# Patient Record
Sex: MALE | Race: WHITE | NOT HISPANIC OR LATINO
[De-identification: names, ages, dates, MRNs, and addresses within clinical notes are randomized per-mention and may not be internally consistent; named-entity substitution may affect disease eponyms.]

---

## 2018-02-23 PROBLEM — Z00.00 ENCOUNTER FOR PREVENTIVE HEALTH EXAMINATION: Status: ACTIVE | Noted: 2018-02-23

## 2018-02-27 PROBLEM — Z87.19 HISTORY OF GASTROESOPHAGEAL REFLUX (GERD): Status: RESOLVED | Noted: 2018-02-27 | Resolved: 2018-02-27

## 2018-02-27 PROBLEM — Z86.79 HISTORY OF HYPERTENSION: Status: RESOLVED | Noted: 2018-02-27 | Resolved: 2018-02-27

## 2018-02-27 PROBLEM — Z86.39 HISTORY OF HYPERLIPIDEMIA: Status: RESOLVED | Noted: 2018-02-27 | Resolved: 2018-02-27

## 2018-02-28 ENCOUNTER — APPOINTMENT (OUTPATIENT)
Dept: CARDIOTHORACIC SURGERY | Facility: CLINIC | Age: 63
End: 2018-02-28
Payer: COMMERCIAL

## 2018-02-28 ENCOUNTER — NON-APPOINTMENT (OUTPATIENT)
Age: 63
End: 2018-02-28

## 2018-02-28 ENCOUNTER — OUTPATIENT (OUTPATIENT)
Dept: OUTPATIENT SERVICES | Facility: HOSPITAL | Age: 63
LOS: 1 days | End: 2018-02-28
Payer: COMMERCIAL

## 2018-02-28 VITALS
DIASTOLIC BLOOD PRESSURE: 71 MMHG | SYSTOLIC BLOOD PRESSURE: 152 MMHG | RESPIRATION RATE: 16 BRPM | TEMPERATURE: 97.2 F | HEART RATE: 64 BPM | BODY MASS INDEX: 31.28 KG/M2 | HEIGHT: 73 IN | OXYGEN SATURATION: 97 % | WEIGHT: 236 LBS

## 2018-02-28 DIAGNOSIS — Q23.1 CONGENITAL INSUFFICIENCY OF AORTIC VALVE: ICD-10-CM

## 2018-02-28 DIAGNOSIS — I35.0 NONRHEUMATIC AORTIC (VALVE) STENOSIS: ICD-10-CM

## 2018-02-28 DIAGNOSIS — I71.2 THORACIC AORTIC ANEURYSM, W/OUT RUPTURE: ICD-10-CM

## 2018-02-28 DIAGNOSIS — Z82.49 FAMILY HISTORY OF ISCHEMIC HEART DISEASE AND OTHER DISEASES OF THE CIRCULATORY SYSTEM: ICD-10-CM

## 2018-02-28 DIAGNOSIS — Z87.39 PERSONAL HISTORY OF OTHER DISEASES OF THE MUSCULOSKELETAL SYSTEM AND CONNECTIVE TISSUE: ICD-10-CM

## 2018-02-28 DIAGNOSIS — Z86.79 PERSONAL HISTORY OF OTHER DISEASES OF THE CIRCULATORY SYSTEM: ICD-10-CM

## 2018-02-28 DIAGNOSIS — G47.33 OBSTRUCTIVE SLEEP APNEA (ADULT) (PEDIATRIC): ICD-10-CM

## 2018-02-28 DIAGNOSIS — F17.290 NICOTINE DEPENDENCE, OTHER TOBACCO PRODUCT, UNCOMPLICATED: ICD-10-CM

## 2018-02-28 DIAGNOSIS — Z80.9 FAMILY HISTORY OF MALIGNANT NEOPLASM, UNSPECIFIED: ICD-10-CM

## 2018-02-28 DIAGNOSIS — Z87.01 PERSONAL HISTORY OF PNEUMONIA (RECURRENT): ICD-10-CM

## 2018-02-28 DIAGNOSIS — Z87.19 PERSONAL HISTORY OF OTHER DISEASES OF THE DIGESTIVE SYSTEM: ICD-10-CM

## 2018-02-28 DIAGNOSIS — Z86.39 PERSONAL HISTORY OF OTHER ENDOCRINE, NUTRITIONAL AND METABOLIC DISEASE: ICD-10-CM

## 2018-02-28 LAB
ALBUMIN SERPL ELPH-MCNC: 4.2 G/DL — SIGNIFICANT CHANGE UP (ref 3.3–5)
ALP SERPL-CCNC: 56 U/L — SIGNIFICANT CHANGE UP (ref 40–120)
ALT FLD-CCNC: 21 U/L — SIGNIFICANT CHANGE UP (ref 10–45)
ANION GAP SERPL CALC-SCNC: 13 MMOL/L — SIGNIFICANT CHANGE UP (ref 5–17)
APPEARANCE UR: CLEAR — SIGNIFICANT CHANGE UP
APTT BLD: 45.1 SEC — HIGH (ref 27.5–37.4)
AST SERPL-CCNC: 25 U/L — SIGNIFICANT CHANGE UP (ref 10–40)
BASOPHILS NFR BLD AUTO: 0.5 % — SIGNIFICANT CHANGE UP (ref 0–2)
BILIRUB SERPL-MCNC: 0.4 MG/DL — SIGNIFICANT CHANGE UP (ref 0.2–1.2)
BILIRUB UR-MCNC: NEGATIVE — SIGNIFICANT CHANGE UP
BUN SERPL-MCNC: 15 MG/DL — SIGNIFICANT CHANGE UP (ref 7–23)
CALCIUM SERPL-MCNC: 9.4 MG/DL — SIGNIFICANT CHANGE UP (ref 8.4–10.5)
CHLORIDE SERPL-SCNC: 103 MMOL/L — SIGNIFICANT CHANGE UP (ref 96–108)
CHOLEST SERPL-MCNC: 199 MG/DL — SIGNIFICANT CHANGE UP (ref 10–199)
CO2 SERPL-SCNC: 24 MMOL/L — SIGNIFICANT CHANGE UP (ref 22–31)
COLOR SPEC: YELLOW — SIGNIFICANT CHANGE UP
CREAT SERPL-MCNC: 1.1 MG/DL — SIGNIFICANT CHANGE UP (ref 0.5–1.3)
DIFF PNL FLD: NEGATIVE — SIGNIFICANT CHANGE UP
EOSINOPHIL NFR BLD AUTO: 4.1 % — SIGNIFICANT CHANGE UP (ref 0–6)
GLUCOSE SERPL-MCNC: 83 MG/DL — SIGNIFICANT CHANGE UP (ref 70–99)
GLUCOSE UR QL: NEGATIVE — SIGNIFICANT CHANGE UP
HBA1C BLD-MCNC: 5.4 % — SIGNIFICANT CHANGE UP (ref 4–5.6)
HBV SURFACE AG SER-ACNC: SIGNIFICANT CHANGE UP
HCT VFR BLD CALC: 47.3 % — SIGNIFICANT CHANGE UP (ref 39–50)
HDLC SERPL-MCNC: 69 MG/DL — SIGNIFICANT CHANGE UP (ref 40–125)
HGB BLD-MCNC: 15.1 G/DL — SIGNIFICANT CHANGE UP (ref 13–17)
INR BLD: 1 — SIGNIFICANT CHANGE UP (ref 0.88–1.16)
KETONES UR-MCNC: NEGATIVE — SIGNIFICANT CHANGE UP
LEUKOCYTE ESTERASE UR-ACNC: NEGATIVE — SIGNIFICANT CHANGE UP
LIPID PNL WITH DIRECT LDL SERPL: 102 MG/DL — SIGNIFICANT CHANGE UP
LYMPHOCYTES # BLD AUTO: 22.7 % — SIGNIFICANT CHANGE UP (ref 13–44)
MCHC RBC-ENTMCNC: 27.8 PG — SIGNIFICANT CHANGE UP (ref 27–34)
MCHC RBC-ENTMCNC: 31.9 G/DL — LOW (ref 32–36)
MCV RBC AUTO: 86.9 FL — SIGNIFICANT CHANGE UP (ref 80–100)
MONOCYTES NFR BLD AUTO: 8.8 % — SIGNIFICANT CHANGE UP (ref 2–14)
NEUTROPHILS NFR BLD AUTO: 63.9 % — SIGNIFICANT CHANGE UP (ref 43–77)
NITRITE UR-MCNC: NEGATIVE — SIGNIFICANT CHANGE UP
PH UR: 6 — SIGNIFICANT CHANGE UP (ref 5–8)
PLATELET # BLD AUTO: 132 K/UL — LOW (ref 150–400)
POTASSIUM SERPL-MCNC: 4.2 MMOL/L — SIGNIFICANT CHANGE UP (ref 3.5–5.3)
POTASSIUM SERPL-SCNC: 4.2 MMOL/L — SIGNIFICANT CHANGE UP (ref 3.5–5.3)
PROT SERPL-MCNC: 7.4 G/DL — SIGNIFICANT CHANGE UP (ref 6–8.3)
PROT UR-MCNC: NEGATIVE MG/DL — SIGNIFICANT CHANGE UP
PROTHROM AB SERPL-ACNC: 11.1 SEC — SIGNIFICANT CHANGE UP (ref 9.8–12.7)
RBC # BLD: 5.44 M/UL — SIGNIFICANT CHANGE UP (ref 4.2–5.8)
RBC # FLD: 13.3 % — SIGNIFICANT CHANGE UP (ref 10.3–16.9)
SODIUM SERPL-SCNC: 140 MMOL/L — SIGNIFICANT CHANGE UP (ref 135–145)
SP GR SPEC: 1.01 — SIGNIFICANT CHANGE UP (ref 1–1.03)
TOTAL CHOLESTEROL/HDL RATIO MEASUREMENT: 2.9 RATIO — LOW (ref 3.4–9.6)
TRIGL SERPL-MCNC: 141 MG/DL — SIGNIFICANT CHANGE UP (ref 10–149)
TSH SERPL-MCNC: 1.41 UIU/ML — SIGNIFICANT CHANGE UP (ref 0.35–4.94)
UROBILINOGEN FLD QL: 0.2 E.U./DL — SIGNIFICANT CHANGE UP
WBC # BLD: 6.6 K/UL — SIGNIFICANT CHANGE UP (ref 3.8–10.5)
WBC # FLD AUTO: 6.6 K/UL — SIGNIFICANT CHANGE UP (ref 3.8–10.5)

## 2018-02-28 PROCEDURE — 86850 RBC ANTIBODY SCREEN: CPT

## 2018-02-28 PROCEDURE — 83036 HEMOGLOBIN GLYCOSYLATED A1C: CPT

## 2018-02-28 PROCEDURE — 85730 THROMBOPLASTIN TIME PARTIAL: CPT

## 2018-02-28 PROCEDURE — 93306 TTE W/DOPPLER COMPLETE: CPT

## 2018-02-28 PROCEDURE — 81003 URINALYSIS AUTO W/O SCOPE: CPT

## 2018-02-28 PROCEDURE — 86900 BLOOD TYPING SEROLOGIC ABO: CPT

## 2018-02-28 PROCEDURE — 36415 COLL VENOUS BLD VENIPUNCTURE: CPT

## 2018-02-28 PROCEDURE — 93306 TTE W/DOPPLER COMPLETE: CPT | Mod: 26

## 2018-02-28 PROCEDURE — 80053 COMPREHEN METABOLIC PANEL: CPT

## 2018-02-28 PROCEDURE — 87340 HEPATITIS B SURFACE AG IA: CPT

## 2018-02-28 PROCEDURE — 86901 BLOOD TYPING SEROLOGIC RH(D): CPT

## 2018-02-28 PROCEDURE — 80061 LIPID PANEL: CPT

## 2018-02-28 PROCEDURE — 85025 COMPLETE CBC W/AUTO DIFF WBC: CPT

## 2018-02-28 PROCEDURE — 85610 PROTHROMBIN TIME: CPT

## 2018-02-28 PROCEDURE — 99205 OFFICE O/P NEW HI 60 MIN: CPT

## 2018-02-28 PROCEDURE — 84443 ASSAY THYROID STIM HORMONE: CPT

## 2018-03-01 PROBLEM — Q23.1 BICUSPID AORTIC VALVE: Status: ACTIVE | Noted: 2018-02-27

## 2018-03-01 PROBLEM — G47.33 OSA (OBSTRUCTIVE SLEEP APNEA): Status: ACTIVE | Noted: 2018-03-01

## 2018-03-01 PROBLEM — Z82.49 FAMILY HISTORY OF CORONARY ARTERY DISEASE: Status: ACTIVE | Noted: 2018-03-01

## 2018-03-01 PROBLEM — Z86.79 HISTORY OF CARDIAC MURMUR: Status: RESOLVED | Noted: 2018-03-01 | Resolved: 2018-03-01

## 2018-03-01 PROBLEM — F17.290 CIGAR SMOKER: Status: ACTIVE | Noted: 2018-03-01

## 2018-03-01 PROBLEM — Z82.49 FAMILY HISTORY OF HYPERTENSION: Status: ACTIVE | Noted: 2018-03-01

## 2018-03-01 PROBLEM — I35.0 AORTIC STENOSIS: Status: ACTIVE | Noted: 2018-02-27

## 2018-03-01 PROBLEM — Z87.39 HISTORY OF CHRONIC BACK PAIN: Status: RESOLVED | Noted: 2018-03-01 | Resolved: 2018-03-01

## 2018-03-01 PROBLEM — I71.2 THORACIC AORTIC ANEURYSM WITHOUT RUPTURE: Status: ACTIVE | Noted: 2018-02-27

## 2018-03-01 PROBLEM — Z80.9 FAMILY HISTORY OF MALIGNANT NEOPLASM: Status: ACTIVE | Noted: 2018-03-01

## 2018-03-01 PROBLEM — Z87.01 HISTORY OF PNEUMONIA: Status: RESOLVED | Noted: 2018-03-01 | Resolved: 2018-03-01

## 2018-03-01 RX ORDER — ZOLPIDEM TARTRATE 10 MG/1
10 TABLET ORAL
Qty: 30 | Refills: 0 | Status: ACTIVE | COMMUNITY
Start: 2017-10-04

## 2018-03-01 RX ORDER — METOPROLOL TARTRATE 50 MG/1
50 TABLET, FILM COATED ORAL
Qty: 30 | Refills: 0 | Status: DISCONTINUED | COMMUNITY
Start: 2017-11-01

## 2018-03-01 RX ORDER — PREDNISONE 20 MG/1
20 TABLET ORAL
Qty: 11 | Refills: 0 | Status: DISCONTINUED | COMMUNITY
Start: 2017-10-18

## 2018-03-01 RX ORDER — HYDROCODONE POLISTIREX AND CHLORPHENIRAMINE POLISTIREX 10; 8 MG/5ML; MG/5ML
10-8 SUSPENSION, EXTENDED RELEASE ORAL
Qty: 210 | Refills: 0 | Status: DISCONTINUED | COMMUNITY
Start: 2017-10-18

## 2018-03-01 RX ORDER — METHYLPREDNISOLONE 4 MG/1
4 TABLET ORAL
Qty: 21 | Refills: 0 | Status: DISCONTINUED | COMMUNITY
Start: 2017-09-26

## 2018-03-01 RX ORDER — CELECOXIB 200 MG/1
200 CAPSULE ORAL
Qty: 90 | Refills: 0 | Status: DISCONTINUED | COMMUNITY
Start: 2017-11-18

## 2018-03-01 RX ORDER — SIMVASTATIN 20 MG/1
20 TABLET, FILM COATED ORAL
Qty: 30 | Refills: 0 | Status: ACTIVE | COMMUNITY
Start: 2018-03-01

## 2018-03-01 RX ORDER — SIMVASTATIN 20 MG/1
20 TABLET, FILM COATED ORAL
Qty: 30 | Refills: 0 | Status: DISCONTINUED | COMMUNITY
Start: 2017-10-18

## 2018-03-01 RX ORDER — ALBUTEROL SULFATE 90 UG/1
108 (90 BASE) AEROSOL, METERED RESPIRATORY (INHALATION)
Qty: 8 | Refills: 0 | Status: DISCONTINUED | COMMUNITY
Start: 2017-09-26

## 2018-03-01 RX ORDER — CEFPODOXIME PROXETIL 200 MG/1
200 TABLET, FILM COATED ORAL
Qty: 20 | Refills: 0 | Status: DISCONTINUED | COMMUNITY
Start: 2017-09-26

## 2018-03-01 RX ORDER — MONTELUKAST 10 MG/1
10 TABLET, FILM COATED ORAL
Refills: 0 | Status: ACTIVE | COMMUNITY
Start: 2018-03-01

## 2018-03-01 RX ORDER — LEVOCETIRIZINE DIHYDROCHLORIDE 5 MG/1
5 TABLET ORAL
Qty: 90 | Refills: 0 | Status: ACTIVE | COMMUNITY
Start: 2017-11-01

## 2018-03-01 RX ORDER — PANTOPRAZOLE 40 MG/1
40 TABLET, DELAYED RELEASE ORAL
Qty: 30 | Refills: 0 | Status: ACTIVE | COMMUNITY
Start: 2017-11-21

## 2018-03-01 RX ORDER — OLMESARTAN MEDOXOMIL 40 MG/1
40 TABLET, FILM COATED ORAL
Qty: 30 | Refills: 0 | Status: DISCONTINUED | COMMUNITY
Start: 2018-02-25

## 2018-03-01 RX ORDER — MONTELUKAST 10 MG/1
10 TABLET, FILM COATED ORAL
Qty: 30 | Refills: 0 | Status: DISCONTINUED | COMMUNITY
Start: 2017-11-13

## 2018-03-01 RX ORDER — L-METHYLFOLATE-ALGAE-VIT B12-B6 CAP 3-90.314-2-35 MG 3-90.314-2-35 MG
3-90.314-2-35 CAP ORAL
Qty: 90 | Refills: 0 | Status: ACTIVE | COMMUNITY
Start: 2017-11-24

## 2018-03-05 DIAGNOSIS — I35.9 NONRHEUMATIC AORTIC VALVE DISORDER, UNSPECIFIED: ICD-10-CM

## 2018-03-06 NOTE — H&P ADULT - ASSESSMENT
62 year old male with a past medical history of hypertension, hyperlipidemia, GERD, sleep apnea (not on CPAP), chronic back pain, presents for evaluation and management of bicuspid aortic valve, aortic stenosis and ascending aortic aneurysm.    Echocardiogram 11/7/17 revealed: LVEF 40-45%. Calcified bicuspid aortic valve with reduced opening. The mean gradient is 30mmHg, moderate aortic stenosis. Peak velocity is 3.2m/s. Mild aortic regurgitation.     Cardiac catheterization 2/21/18 revealed: no coronary artery disease. VILLA 0.6cm2. Mean gradient 36mmHg.     Repeat echocardiogram today revealed: left ventricular ejection fraction is estimated to be 65-70%. Calcified aortic valve. The number of aortic valve cusps cannot be discerned. No aortic regurgitation noted. There is Severe aortic stenosis. The mean pressure gradient is 45 mmHg. No aortic root dilatation.  There is no pericardial effusion. The calculated aortic valve area using the continuity equation is 0.7 cm2.    CTA chest 3/2/18 revealed: aortic root 4.3cm.     The patient c/o fatigue and decrease in stamina. The patient states that it is more difficult for him to walk up 2 flights of stairs compared to last year because he is feeling winded and has mild dyspnea on exertion. He also states he has periodic episodes of dizziness with positional changes and "chest fluttering". Patient denies fever, chills, fatigue, headache, blurred vision, syncope, chest pain, palpitations, shortness of breath at rest orthopnea, paroxysmal nocturnal dyspnea, nausea, vomiting, abdominal pain, back pain, BRBPR or swelling to legs.    Plan:   I have reviewed the indications for surgery, and used our webpage [email or web address] <[email or web address]> to illustrate the aorta and anatomy of the heart. I discussed the indications for surgery with the patient. Given his symptoms of decreased stamina, shortness of breath (NYHA Class II), severe aortic stenosis with a mean gradient of 45mmHg, he meets those indications.     I have recommended that the patient is a candidate for an minimally invasive Aortic Valve Replacement.    I have described 2 valve options. I have described the mechanical valve prosthesis which does require Coumadin therapy with a daily pill as well as frequent lab tests. The mechanical valve has excellent longevity and is usually only removed in the cases of infective bacterial prosthetic valve endocarditis or pannus formation. Approximately over 90% of mechanical valves never need to be removed. However, there is a risk with Coumadin, a blood thinner, approximately a 1% thromboembolic risk per year.  The other valve option is a biological valve. In general, approximately 50% of these need to be removed at approximately 15 years. However, these valves do not require Coumadin therapy and, therefore, do not have the associated risks of the blood thinner.    The patient has chosen a bioprosthetic valve.    Of note, I discussed that with the current use of Transcatheter Aortic Valve Replacement (TAVR), there is a possibility that future replacement of a failing bioprosthetic valve by TAVR may be an option.     I have explained the risks of the procedure, including approximately 1-2% major mortality or morbidity including stroke, infection, bleeding, death, renal failure and heart attack. The patient understands the risks and wishes to proceed. I have answered all questions fully.

## 2018-03-06 NOTE — H&P ADULT - NSHPREVIEWOFSYSTEMS_GEN_ALL_CORE
CONSTITUTIONAL:  fatigue DENIES Fevers / chills, sweats, weight loss, weight gain                                      NEURO:  parathesias, seizures, syncope, confusion                                                                               NEGATIVE  EYES:  Blurry vision, discharge, pain, loss of vision                                                                                  NEGATIVE  ENMT:  Difficulty hearing, vertigo, dysphagia, epistaxis, recent dental work                                     NEGATIVE  CV:  Chest pain, palpitations, HUDSON, orthopnea                                                                                         NEGATIVE  RESPIRATORY:  Wheezing, SOB, cough / sputum, hemoptysis                                                              NEGATIVE  GI:  Nausea, vommiting, diarrhea, constipation, melena                                                                        NEGATIVE  : Hematuria, dysuria, urgency, incontinence                                                                                       NEGATIVE  MUSKULOSKELETAL:  arthritis, joint swelling, muscle weakness                                                           NEGATIVE  SKIN/BREAST:  rash, itching, norm loss, masses                                                                                            NEGATIVE  PSYCH:  depresion, anxiety, suicidal ideation                                                                                             NEGATIVE  HEME/LYMPH:  bruises easily, enlarged lymph nodes, tender lymph nodes                                        NEGATIVE  ENDOCRINE:  cold intolerance, heat intolerance, polydipsia                                                                   NEGATIVE

## 2018-03-06 NOTE — H&P ADULT - FAMILY HISTORY
Mother  Still living? Unknown  Hypertension, Age at diagnosis: Age Unknown  Family history of early CAD, Age at diagnosis: Age Unknown     Father  Still living? Unknown  Family history of cancer, Age at diagnosis: Age Unknown

## 2018-03-06 NOTE — H&P ADULT - HISTORY OF PRESENT ILLNESS
62 year old male with a past medical history of hypertension, hyperlipidemia, GERD, sleep apnea (not on CPAP), chronic back pain, presents for evaluation and management of bicuspid aortic valve, aortic stenosis and ascending aortic aneurysm.    Echocardiogram 11/7/17 revealed: LVEF 40-45%. Calcified bicuspid aortic valve with reduced opening. The mean gradient is 30mmHg, moderate aortic stenosis. Peak velocity is 3.2m/s. Mild aortic regurgitation.     Cardiac catheterization 2/21/18 revealed: no coronary artery disease. VILLA 0.6cm2. Mean gradient 36mmHg.     Repeat echocardiogram today revealed: left ventricular ejection fraction is estimated to be 65-70%. Calcified aortic valve. The number of aortic valve cusps cannot be discerned. No aortic regurgitation noted. There is Severe aortic stenosis. The mean pressure gradient is 45 mmHg. No aortic root dilatation.  There is no pericardial effusion. The calculated aortic valve area using the continuity equation is 0.7 cm2.    CTA chest 3/2/18 revealed: aortic root 4.3cm.     The patient c/o fatigue and decrease in stamina. The patient states that it is more difficult for him to walk up 2 flights of stairs compared to last year because he is feeling winded and has mild dyspnea on exertion. He also states he has periodic episodes of dizziness with positional changes and "chest fluttering". Patient denies fever, chills, fatigue, headache, blurred vision, syncope, chest pain, palpitations, shortness of breath at rest orthopnea, paroxysmal nocturnal dyspnea, nausea, vomiting, abdominal pain, back pain, BRBPR or swelling to legs.    The patient was consulted by CT surgery and is deemed a candidate for a minimally invasive aortic valve replacement.

## 2018-03-06 NOTE — H&P ADULT - NSHPLABSRESULTS_GEN_ALL_CORE
Echocardiogram 2/28/18    Interpretation Summary  Normal left ventricular size and wall thickness.  The left ventricular wall motion is normal.  The left ventricular ejection fraction is estimated to be 65-70%  The left atrial size is normal.  Right atrial size is normal.  The right ventricle is normal in size and function.  Calcified aortic valve.  The number of aortic valve cusps cannot be discerned.  No aortic regurgitation noted.  There is Severe aortic stenosis.  The mean pressure gradient is 45 mmHg.  The calculated aortic valve area using the continuity equation is 0.7 cm2.  There is mild mitral annular calcification.  No mitral regurgitation noted.  Other valves normal in structure and function.  The pulmonary artery systolic pressure is estimated to be 29 mmHg.  No aortic root dilatation.  The inferior vena cava is normal in size (<2.1 cm) with normal inspiratory collapse (>50%) consistent with normal right atrial pressure.  There is no pericardial effusion.    Echocardiogram 11/7/17 revealed: LVEF 40-45%. Calcified bicuspid aortic valve with reduced opening. The mean gradient is 30mmHg, moderate aortic stenosis. Peak velocity is 3.2m/s. Mild aortic regurgitation.     Cardiac catheterization 2/21/18 revealed: no coronary artery disease. VILLA 0.6cm2. Mean gradient 36mmHg.    CTA chest 3/2/18 aortic root 4.3cm    Carotid doppler 3/2/18 there is no evidence of hemodynamically significant internal carotid artery stenosis. there is less than 50 percent stenosis seen bilaterally according to SRU criteria. There is mild bilateral atherosclerosis of the carotid bulbs.     Chest xray 3/2/18 no acute pulmonary disease.                                   15.1  	6.6   )-----------( 432     	            47.3  	    	140  |  103  |  15  	----------------------------<  83  	4.2   |  24  |  1.10                TSH 1.412                             Taurus 9.4    	TPro  7.4  /  Alb  4.2  /  TBili  0.4  /   AST  25  /  ALT  21  /  AlkPhos 562                 Hemoglobin A1C 5.4

## 2018-03-06 NOTE — H&P ADULT - PMH
Bicuspid aortic valve    Chronic back pain    GERD (gastroesophageal reflux disease)    High cholesterol    Hypertension    Severe aortic stenosis    Sleep apnea

## 2018-03-06 NOTE — H&P ADULT - NSHPPHYSICALEXAM_GEN_ALL_CORE
Vital Signs   	Recorded: [id number] 11:30AM  Systolic	152  Diastolic	71  Height	6 ft 1 in  Weight	236 lb   BMI Calculated	31.14  BSA Calculated	2.31  Temperature	97.2 F  Heart Rate	64  Respiration	16  O2 Saturation	97    Physical Exam  Constitutional: alert and in no acute distress.   Eyes: the sclera and conjunctiva were normal, pupils were equal in size, round, and reactive to light and extraocular movements were intact.   ENT: the ears and nose were normal in appearance . the oropharynx was normal.   Neck: the appearance of the neck was normal, no neck mass was observed, the thyroid was not enlarged and there were no palpable thyroid nodules . there was no jugular-venous distention.   Pulmonary: no respiratory distress and lungs were clear to auscultation bilaterally.   Cardiovascular: The heart rate was normal. The rhythm was regular. Heart sounds: normal S1, normal S2.   A grade 3 systolic murmur was heard at the RUSB.   Chest: the chest was normal in appearance, no chest asymmetry and normal chest expansion.   Vascular: Carotid: right 2+, left 2+, no bruit heard over the right carotid and no bruit heard over the left carotid. Radial: right 2+ and left 2+.   Abdominal Aorta: the abdominal aorta was not enlarged and no bruit was heard.   Breasts:. deferred.   Abdomen: normal bowel sounds, soft, non-tender, no hepato-splenomegaly and no abdominal mass palpated.   Genitourinary:. deferred.   Lymphatics: The posterior cervical and anterior cervical nodes were non-tender and normal size.   Back: no costovertebral angle tenderness and no spinal tenderness.   Musculoskeletal: normal gait, no clubbing or cyanosis of the fingernails, no joint swelling seen and muscle strength and tone were normal.   Skin: normal skin color and pigmentation, normal skin turgor and no rash.   Neurological: deep tendon reflexes were 2+ and symmetric, the sensory exam was normal to light touch and pinprick and no focal deficits.   Psychiatric: oriented to person, place, and time, insight and judgment were intact and the affect was normal.

## 2018-03-09 RX ORDER — TRIAMCINOLONE ACETONIDE 55 MCG
0 AEROSOL, SPRAY (GRAM) NASAL
Qty: 0 | Refills: 0 | COMMUNITY

## 2018-03-09 NOTE — PATIENT PROFILE ADULT. - PMH
Bicuspid aortic valve    Chronic back pain    GERD (gastroesophageal reflux disease)    High cholesterol    Hypertension    Severe aortic stenosis    Sleep apnea Bicuspid aortic valve    Chronic back pain    GERD (gastroesophageal reflux disease)    High cholesterol    Hypertension    Postnasal drip    Severe aortic stenosis    Sleep apnea

## 2018-03-12 ENCOUNTER — RESULT REVIEW (OUTPATIENT)
Age: 63
End: 2018-03-12

## 2018-03-12 ENCOUNTER — INPATIENT (INPATIENT)
Facility: HOSPITAL | Age: 63
LOS: 5 days | Discharge: HOME | DRG: 220 | End: 2018-03-18
Attending: THORACIC SURGERY (CARDIOTHORACIC VASCULAR SURGERY) | Admitting: THORACIC SURGERY (CARDIOTHORACIC VASCULAR SURGERY)
Payer: COMMERCIAL

## 2018-03-12 ENCOUNTER — APPOINTMENT (OUTPATIENT)
Dept: CARDIOTHORACIC SURGERY | Facility: HOSPITAL | Age: 63
End: 2018-03-12
Payer: COMMERCIAL

## 2018-03-12 VITALS
HEART RATE: 61 BPM | RESPIRATION RATE: 18 BRPM | TEMPERATURE: 98 F | WEIGHT: 235.01 LBS | DIASTOLIC BLOOD PRESSURE: 88 MMHG | SYSTOLIC BLOOD PRESSURE: 177 MMHG | OXYGEN SATURATION: 97 %

## 2018-03-12 DIAGNOSIS — Z98.890 OTHER SPECIFIED POSTPROCEDURAL STATES: Chronic | ICD-10-CM

## 2018-03-12 DIAGNOSIS — I35.0 NONRHEUMATIC AORTIC (VALVE) STENOSIS: ICD-10-CM

## 2018-03-12 PROBLEM — I10 ESSENTIAL (PRIMARY) HYPERTENSION: Chronic | Status: ACTIVE | Noted: 2018-03-06

## 2018-03-12 PROBLEM — Q23.1 CONGENITAL INSUFFICIENCY OF AORTIC VALVE: Chronic | Status: ACTIVE | Noted: 2018-03-06

## 2018-03-12 PROBLEM — E78.00 PURE HYPERCHOLESTEROLEMIA, UNSPECIFIED: Chronic | Status: ACTIVE | Noted: 2018-03-06

## 2018-03-12 PROBLEM — R09.82 POSTNASAL DRIP: Chronic | Status: ACTIVE | Noted: 2018-03-09

## 2018-03-12 PROBLEM — K21.9 GASTRO-ESOPHAGEAL REFLUX DISEASE WITHOUT ESOPHAGITIS: Chronic | Status: ACTIVE | Noted: 2018-03-06

## 2018-03-12 PROBLEM — G47.30 SLEEP APNEA, UNSPECIFIED: Chronic | Status: ACTIVE | Noted: 2018-03-06

## 2018-03-12 PROBLEM — M54.9 DORSALGIA, UNSPECIFIED: Chronic | Status: ACTIVE | Noted: 2018-03-06

## 2018-03-12 LAB
ALBUMIN SERPL ELPH-MCNC: 3.5 G/DL — SIGNIFICANT CHANGE UP (ref 3.3–5)
ALP SERPL-CCNC: 31 U/L — LOW (ref 40–120)
ALT FLD-CCNC: 22 U/L — SIGNIFICANT CHANGE UP (ref 10–45)
ANION GAP SERPL CALC-SCNC: 10 MMOL/L — SIGNIFICANT CHANGE UP (ref 5–17)
ANION GAP SERPL CALC-SCNC: 12 MMOL/L — SIGNIFICANT CHANGE UP (ref 5–17)
ANION GAP SERPL CALC-SCNC: 13 MMOL/L — SIGNIFICANT CHANGE UP (ref 5–17)
APTT BLD: 25.4 SEC — LOW (ref 27.5–37.4)
APTT BLD: 40.6 SEC — HIGH (ref 27.5–37.4)
APTT BLD: 45.6 SEC — HIGH (ref 27.5–37.4)
APTT BLD: 53.2 SEC — HIGH (ref 27.5–37.4)
AST SERPL-CCNC: 50 U/L — HIGH (ref 10–40)
BASE EXCESS BLDA CALC-SCNC: -1.4 MMOL/L — SIGNIFICANT CHANGE UP (ref -2–3)
BASE EXCESS BLDA CALC-SCNC: -4.6 MMOL/L — LOW (ref -2–3)
BILIRUB SERPL-MCNC: 0.9 MG/DL — SIGNIFICANT CHANGE UP (ref 0.2–1.2)
BLD GP AB SCN SERPL QL: NEGATIVE — SIGNIFICANT CHANGE UP
BUN SERPL-MCNC: 15 MG/DL — SIGNIFICANT CHANGE UP (ref 7–23)
BUN SERPL-MCNC: 15 MG/DL — SIGNIFICANT CHANGE UP (ref 7–23)
BUN SERPL-MCNC: 18 MG/DL — SIGNIFICANT CHANGE UP (ref 7–23)
CALCIUM SERPL-MCNC: 7.4 MG/DL — LOW (ref 8.4–10.5)
CALCIUM SERPL-MCNC: 7.4 MG/DL — LOW (ref 8.4–10.5)
CALCIUM SERPL-MCNC: 7.7 MG/DL — LOW (ref 8.4–10.5)
CHLORIDE SERPL-SCNC: 104 MMOL/L — SIGNIFICANT CHANGE UP (ref 96–108)
CHLORIDE SERPL-SCNC: 105 MMOL/L — SIGNIFICANT CHANGE UP (ref 96–108)
CHLORIDE SERPL-SCNC: 105 MMOL/L — SIGNIFICANT CHANGE UP (ref 96–108)
CO2 SERPL-SCNC: 23 MMOL/L — SIGNIFICANT CHANGE UP (ref 22–31)
CO2 SERPL-SCNC: 23 MMOL/L — SIGNIFICANT CHANGE UP (ref 22–31)
CO2 SERPL-SCNC: 24 MMOL/L — SIGNIFICANT CHANGE UP (ref 22–31)
CREAT SERPL-MCNC: 0.97 MG/DL — SIGNIFICANT CHANGE UP (ref 0.5–1.3)
CREAT SERPL-MCNC: 1 MG/DL — SIGNIFICANT CHANGE UP (ref 0.5–1.3)
CREAT SERPL-MCNC: 1.04 MG/DL — SIGNIFICANT CHANGE UP (ref 0.5–1.3)
GAS PNL BLDA: SIGNIFICANT CHANGE UP
GLUCOSE BLDC GLUCOMTR-MCNC: 129 MG/DL — HIGH (ref 70–99)
GLUCOSE BLDC GLUCOMTR-MCNC: 132 MG/DL — HIGH (ref 70–99)
GLUCOSE BLDC GLUCOMTR-MCNC: 140 MG/DL — HIGH (ref 70–99)
GLUCOSE SERPL-MCNC: 136 MG/DL — HIGH (ref 70–99)
GLUCOSE SERPL-MCNC: 148 MG/DL — HIGH (ref 70–99)
GLUCOSE SERPL-MCNC: 149 MG/DL — HIGH (ref 70–99)
HCO3 BLDA-SCNC: 21 MMOL/L — SIGNIFICANT CHANGE UP (ref 21–28)
HCO3 BLDA-SCNC: 24 MMOL/L — SIGNIFICANT CHANGE UP (ref 21–28)
HCT VFR BLD CALC: 30.8 % — LOW (ref 39–50)
HCT VFR BLD CALC: 32 % — LOW (ref 39–50)
HCT VFR BLD CALC: 33.1 % — LOW (ref 39–50)
HCT VFR BLD CALC: 37.5 % — LOW (ref 39–50)
HGB BLD-MCNC: 10.3 G/DL — LOW (ref 13–17)
HGB BLD-MCNC: 10.3 G/DL — LOW (ref 13–17)
HGB BLD-MCNC: 11.9 G/DL — LOW (ref 13–17)
HGB BLD-MCNC: 9.7 G/DL — LOW (ref 13–17)
INR BLD: 1.21 — HIGH (ref 0.88–1.16)
INR BLD: 1.22 — HIGH (ref 0.88–1.16)
INR BLD: 1.23 — HIGH (ref 0.88–1.16)
INR BLD: 1.24 — HIGH (ref 0.88–1.16)
LACTATE SERPL-SCNC: 1.2 MMOL/L — SIGNIFICANT CHANGE UP (ref 0.5–2)
LYMPHOCYTES # BLD AUTO: 6 % — LOW (ref 13–44)
MAGNESIUM SERPL-MCNC: 2.5 MG/DL — SIGNIFICANT CHANGE UP (ref 1.6–2.6)
MAGNESIUM SERPL-MCNC: 2.8 MG/DL — HIGH (ref 1.6–2.6)
MAGNESIUM SERPL-MCNC: 3.1 MG/DL — HIGH (ref 1.6–2.6)
MCHC RBC-ENTMCNC: 27.2 PG — SIGNIFICANT CHANGE UP (ref 27–34)
MCHC RBC-ENTMCNC: 27.6 PG — SIGNIFICANT CHANGE UP (ref 27–34)
MCHC RBC-ENTMCNC: 28 PG — SIGNIFICANT CHANGE UP (ref 27–34)
MCHC RBC-ENTMCNC: 28.2 PG — SIGNIFICANT CHANGE UP (ref 27–34)
MCHC RBC-ENTMCNC: 31.1 G/DL — LOW (ref 32–36)
MCHC RBC-ENTMCNC: 31.5 G/DL — LOW (ref 32–36)
MCHC RBC-ENTMCNC: 31.7 G/DL — LOW (ref 32–36)
MCHC RBC-ENTMCNC: 32.2 G/DL — SIGNIFICANT CHANGE UP (ref 32–36)
MCV RBC AUTO: 87.6 FL — SIGNIFICANT CHANGE UP (ref 80–100)
MCV RBC AUTO: 87.7 FL — SIGNIFICANT CHANGE UP (ref 80–100)
MCV RBC AUTO: 87.7 FL — SIGNIFICANT CHANGE UP (ref 80–100)
MCV RBC AUTO: 88.2 FL — SIGNIFICANT CHANGE UP (ref 80–100)
MONOCYTES NFR BLD AUTO: 6 % — SIGNIFICANT CHANGE UP (ref 2–14)
NEUTROPHILS NFR BLD AUTO: 82 % — HIGH (ref 43–77)
PCO2 BLDA: 43 MMHG — SIGNIFICANT CHANGE UP (ref 35–48)
PCO2 BLDA: 44 MMHG — SIGNIFICANT CHANGE UP (ref 35–48)
PH BLDA: 7.32 — LOW (ref 7.35–7.45)
PH BLDA: 7.36 — SIGNIFICANT CHANGE UP (ref 7.35–7.45)
PLATELET # BLD AUTO: 127 K/UL — LOW (ref 150–400)
PLATELET # BLD AUTO: 48 K/UL — LOW (ref 150–400)
PLATELET # BLD AUTO: 70 K/UL — LOW (ref 150–400)
PLATELET # BLD AUTO: 74 K/UL — LOW (ref 150–400)
PO2 BLDA: 119 MMHG — HIGH (ref 83–108)
PO2 BLDA: 95 MMHG — SIGNIFICANT CHANGE UP (ref 83–108)
POTASSIUM SERPL-MCNC: 4.4 MMOL/L — SIGNIFICANT CHANGE UP (ref 3.5–5.3)
POTASSIUM SERPL-MCNC: 4.8 MMOL/L — SIGNIFICANT CHANGE UP (ref 3.5–5.3)
POTASSIUM SERPL-MCNC: 5.4 MMOL/L — HIGH (ref 3.5–5.3)
POTASSIUM SERPL-SCNC: 4.4 MMOL/L — SIGNIFICANT CHANGE UP (ref 3.5–5.3)
POTASSIUM SERPL-SCNC: 4.8 MMOL/L — SIGNIFICANT CHANGE UP (ref 3.5–5.3)
POTASSIUM SERPL-SCNC: 5.4 MMOL/L — HIGH (ref 3.5–5.3)
PROT SERPL-MCNC: 5.2 G/DL — LOW (ref 6–8.3)
PROTHROM AB SERPL-ACNC: 13.5 SEC — HIGH (ref 9.8–12.7)
PROTHROM AB SERPL-ACNC: 13.6 SEC — HIGH (ref 9.8–12.7)
PROTHROM AB SERPL-ACNC: 13.7 SEC — HIGH (ref 9.8–12.7)
PROTHROM AB SERPL-ACNC: 13.8 SEC — HIGH (ref 9.8–12.7)
RBC # BLD: 3.51 M/UL — LOW (ref 4.2–5.8)
RBC # BLD: 3.65 M/UL — LOW (ref 4.2–5.8)
RBC # BLD: 3.78 M/UL — LOW (ref 4.2–5.8)
RBC # BLD: 4.25 M/UL — SIGNIFICANT CHANGE UP (ref 4.2–5.8)
RBC # FLD: 13.4 % — SIGNIFICANT CHANGE UP (ref 10.3–16.9)
RBC # FLD: 13.4 % — SIGNIFICANT CHANGE UP (ref 10.3–16.9)
RBC # FLD: 13.5 % — SIGNIFICANT CHANGE UP (ref 10.3–16.9)
RBC # FLD: 13.5 % — SIGNIFICANT CHANGE UP (ref 10.3–16.9)
RH IG SCN BLD-IMP: POSITIVE — SIGNIFICANT CHANGE UP
SAO2 % BLDA: 97 % — SIGNIFICANT CHANGE UP (ref 95–100)
SAO2 % BLDA: 98 % — SIGNIFICANT CHANGE UP (ref 95–100)
SODIUM SERPL-SCNC: 138 MMOL/L — SIGNIFICANT CHANGE UP (ref 135–145)
SODIUM SERPL-SCNC: 140 MMOL/L — SIGNIFICANT CHANGE UP (ref 135–145)
SODIUM SERPL-SCNC: 141 MMOL/L — SIGNIFICANT CHANGE UP (ref 135–145)
WBC # BLD: 11.7 K/UL — HIGH (ref 3.8–10.5)
WBC # BLD: 12.3 K/UL — HIGH (ref 3.8–10.5)
WBC # BLD: 25 K/UL — HIGH (ref 3.8–10.5)
WBC # BLD: 8.9 K/UL — SIGNIFICANT CHANGE UP (ref 3.8–10.5)
WBC # FLD AUTO: 11.7 K/UL — HIGH (ref 3.8–10.5)
WBC # FLD AUTO: 12.3 K/UL — HIGH (ref 3.8–10.5)
WBC # FLD AUTO: 25 K/UL — HIGH (ref 3.8–10.5)
WBC # FLD AUTO: 8.9 K/UL — SIGNIFICANT CHANGE UP (ref 3.8–10.5)

## 2018-03-12 PROCEDURE — 33405 REPLACEMENT AORTIC VALVE OPN: CPT

## 2018-03-12 PROCEDURE — 93010 ELECTROCARDIOGRAM REPORT: CPT

## 2018-03-12 PROCEDURE — 33208 INSRT HEART PM ATRIAL & VENT: CPT | Mod: KX

## 2018-03-12 PROCEDURE — 33405 REPLACEMENT AORTIC VALVE OPN: CPT | Mod: 80

## 2018-03-12 PROCEDURE — 71045 X-RAY EXAM CHEST 1 VIEW: CPT | Mod: 26

## 2018-03-12 RX ORDER — DEXTROSE 50 % IN WATER 50 %
25 SYRINGE (ML) INTRAVENOUS
Qty: 0 | Refills: 0 | Status: DISCONTINUED | OUTPATIENT
Start: 2018-03-12 | End: 2018-03-13

## 2018-03-12 RX ORDER — ALBUMIN HUMAN 25 %
250 VIAL (ML) INTRAVENOUS
Qty: 0 | Refills: 0 | Status: COMPLETED | OUTPATIENT
Start: 2018-03-12 | End: 2018-03-12

## 2018-03-12 RX ORDER — FAMOTIDINE 10 MG/ML
20 INJECTION INTRAVENOUS DAILY
Qty: 0 | Refills: 0 | Status: DISCONTINUED | OUTPATIENT
Start: 2018-03-12 | End: 2018-03-13

## 2018-03-12 RX ORDER — DEXTROSE 50 % IN WATER 50 %
50 SYRINGE (ML) INTRAVENOUS
Qty: 0 | Refills: 0 | Status: DISCONTINUED | OUTPATIENT
Start: 2018-03-12 | End: 2018-03-13

## 2018-03-12 RX ORDER — DEXMEDETOMIDINE HYDROCHLORIDE IN 0.9% SODIUM CHLORIDE 4 UG/ML
0.7 INJECTION INTRAVENOUS
Qty: 200 | Refills: 0 | Status: DISCONTINUED | OUTPATIENT
Start: 2018-03-12 | End: 2018-03-13

## 2018-03-12 RX ORDER — FENTANYL CITRATE 50 UG/ML
50 INJECTION INTRAVENOUS ONCE
Qty: 0 | Refills: 0 | Status: DISCONTINUED | OUTPATIENT
Start: 2018-03-12 | End: 2018-03-12

## 2018-03-12 RX ORDER — SIMVASTATIN 20 MG/1
20 TABLET, FILM COATED ORAL AT BEDTIME
Qty: 0 | Refills: 0 | Status: DISCONTINUED | OUTPATIENT
Start: 2018-03-12 | End: 2018-03-18

## 2018-03-12 RX ORDER — PROPOFOL 10 MG/ML
5 INJECTION, EMULSION INTRAVENOUS
Qty: 1000 | Refills: 0 | Status: DISCONTINUED | OUTPATIENT
Start: 2018-03-12 | End: 2018-03-13

## 2018-03-12 RX ORDER — ASPIRIN/CALCIUM CARB/MAGNESIUM 324 MG
81 TABLET ORAL DAILY
Qty: 0 | Refills: 0 | Status: DISCONTINUED | OUTPATIENT
Start: 2018-03-13 | End: 2018-03-18

## 2018-03-12 RX ORDER — SODIUM CHLORIDE 9 MG/ML
1000 INJECTION, SOLUTION INTRAVENOUS
Qty: 0 | Refills: 0 | Status: DISCONTINUED | OUTPATIENT
Start: 2018-03-12 | End: 2018-03-18

## 2018-03-12 RX ORDER — MEPERIDINE HYDROCHLORIDE 50 MG/ML
25 INJECTION INTRAMUSCULAR; INTRAVENOUS; SUBCUTANEOUS ONCE
Qty: 0 | Refills: 0 | Status: DISCONTINUED | OUTPATIENT
Start: 2018-03-12 | End: 2018-03-13

## 2018-03-12 RX ORDER — HEPARIN SODIUM 5000 [USP'U]/ML
5000 INJECTION INTRAVENOUS; SUBCUTANEOUS EVERY 8 HOURS
Qty: 0 | Refills: 0 | Status: DISCONTINUED | OUTPATIENT
Start: 2018-03-13 | End: 2018-03-18

## 2018-03-12 RX ORDER — CEFAZOLIN SODIUM 1 G
2000 VIAL (EA) INJECTION EVERY 8 HOURS
Qty: 0 | Refills: 0 | Status: DISCONTINUED | OUTPATIENT
Start: 2018-03-12 | End: 2018-03-13

## 2018-03-12 RX ORDER — INSULIN HUMAN 100 [IU]/ML
1 INJECTION, SOLUTION SUBCUTANEOUS
Qty: 100 | Refills: 0 | Status: DISCONTINUED | OUTPATIENT
Start: 2018-03-12 | End: 2018-03-13

## 2018-03-12 RX ORDER — ACETAMINOPHEN 500 MG
1000 TABLET ORAL ONCE
Qty: 0 | Refills: 0 | Status: COMPLETED | OUTPATIENT
Start: 2018-03-12 | End: 2018-03-12

## 2018-03-12 RX ORDER — NOREPINEPHRINE BITARTRATE/D5W 8 MG/250ML
0.04 PLASTIC BAG, INJECTION (ML) INTRAVENOUS
Qty: 8 | Refills: 0 | Status: DISCONTINUED | OUTPATIENT
Start: 2018-03-12 | End: 2018-03-13

## 2018-03-12 RX ADMIN — DEXMEDETOMIDINE HYDROCHLORIDE IN 0.9% SODIUM CHLORIDE 18.66 MICROGRAM(S)/KG/HR: 4 INJECTION INTRAVENOUS at 15:36

## 2018-03-12 RX ADMIN — FENTANYL CITRATE 50 MICROGRAM(S): 50 INJECTION INTRAVENOUS at 20:30

## 2018-03-12 RX ADMIN — Medication 125 MILLILITER(S): at 15:08

## 2018-03-12 RX ADMIN — Medication 1000 MILLIGRAM(S): at 19:30

## 2018-03-12 RX ADMIN — Medication 100 MILLIGRAM(S): at 16:05

## 2018-03-12 RX ADMIN — Medication 125 MILLILITER(S): at 15:10

## 2018-03-12 RX ADMIN — Medication 8 MICROGRAM(S)/KG/MIN: at 14:52

## 2018-03-12 RX ADMIN — Medication 125 MILLILITER(S): at 14:09

## 2018-03-12 RX ADMIN — DEXMEDETOMIDINE HYDROCHLORIDE IN 0.9% SODIUM CHLORIDE 18.66 MICROGRAM(S)/KG/HR: 4 INJECTION INTRAVENOUS at 20:04

## 2018-03-12 RX ADMIN — FAMOTIDINE 20 MILLIGRAM(S): 10 INJECTION INTRAVENOUS at 20:48

## 2018-03-12 RX ADMIN — FENTANYL CITRATE 50 MICROGRAM(S): 50 INJECTION INTRAVENOUS at 20:00

## 2018-03-12 RX ADMIN — Medication 400 MILLIGRAM(S): at 19:01

## 2018-03-12 RX ADMIN — SODIUM CHLORIDE 10 MILLILITER(S): 9 INJECTION, SOLUTION INTRAVENOUS at 15:01

## 2018-03-12 RX ADMIN — SIMVASTATIN 20 MILLIGRAM(S): 20 TABLET, FILM COATED ORAL at 22:36

## 2018-03-12 RX ADMIN — Medication 125 MILLILITER(S): at 15:11

## 2018-03-12 NOTE — PROGRESS NOTE ADULT - ASSESSMENT
Pt is a 63 yo M with h/o sleep apnea (not on CPAP), HTN, HLD, GERD and chronic back pain admitted for evaluation and management of bicuspid aortic valve, aortic stenosis and ascending aortic aneurysm.  Pt worked up with followin) Echocardiogram 17 revealed: LVEF 40-45%. Calcified bicuspid aortic valve with reduced opening. The mean gradient is 30mmHg, moderate aortic stenosis. Peak velocity is 3.2m/s. Mild aortic regurgitation.   2) Cardiac catheterization 18 revealed: no coronary artery disease. VILLA 0.6cm2. Mean gradient 36mmHg.   3) Repeat echocardiogram today revealed: left ventricular ejection fraction is estimated to be 65-70%. Calcified aortic valve. The number of aortic valve cusps cannot be discerned. No aortic regurgitation noted. There is Severe aortic stenosis. The mean pressure gradient is 45 mmHg. No aortic root dilatation.        4) CTA chest 3/2/18 revealed: aortic root 4.3cm.         Today pt is s/p AVR with placement of AV pacing wires, R CT, Blakes x2 R. Intra-op fluids: LR 2.1 L 500ml Pump blood.  ml    Resp: Adjust MV according to ABG/ Pt possibly to be extubated later today  ID: Ancef prophylaxis  CVS: Volume replacement with Albumin and Levophed to maintain  range  FEN: NPO  Endo: Glycemic control with Insulin drip  Renal: Follow BUN/Cr and UO  Neuro: Sedate with Precedex + Propofol    CCT: 45 min

## 2018-03-12 NOTE — PROGRESS NOTE ADULT - SUBJECTIVE AND OBJECTIVE BOX
HPI:  Pt is a 63 yo M with h/o sleep apnea (not on CPAP), HTN, HLD, GERD and chronic back pain admitted for evaluation and management of bicuspid aortic valve, aortic stenosis and ascending aortic aneurysm.  Pt worked up with followin) Echocardiogram 17 revealed: LVEF 40-45%. Calcified bicuspid aortic valve with reduced opening. The mean gradient is 30mmHg, moderate aortic stenosis. Peak velocity is 3.2m/s. Mild aortic regurgitation.   2) Cardiac catheterization 18 revealed: no coronary artery disease. VILLA 0.6cm2. Mean gradient 36mmHg.   3) Repeat echocardiogram today revealed: left ventricular ejection fraction is estimated to be 65-70%. Calcified aortic valve. The number of aortic valve cusps cannot be discerned. No aortic regurgitation noted. There is Severe aortic stenosis. The mean pressure gradient is 45 mmHg. No aortic root dilatation.        4) CTA chest 3/2/18 revealed: aortic root 4.3cm.         Today pt is s/p AVR with placement of AV pacing wires, R CT, Blakes x2 R. Intra-op fluids: LR 2.1 L 500ml Pump blood.  ml  	    ## Labs:  CBC:                        11.9   25.0  )-----------( 127      ( 12 Mar 2018 13:57 )             37.5     Chem:      138  |  105  |  15  ----------------------------<  136<H>  5.4<H>   |  23  |  0.97    Ca    7.4<L>      12 Mar 2018 13:57  Mg     3.1           Coags:  PT/INR - ( 12 Mar 2018 13:57 )   PT: 13.6 sec;   INR: 1.22        PTT - ( 12 Mar 2018 13:57 )  PTT:40.6 sec    ## Imaging:    ## Medications:  ceFAZolin   IVPB 2000 milliGRAM(s) IV Intermittent every 8 hours    norepinephrine Infusion 0.04 MICROgram(s)/kG/Min IV Continuous <Continuous>    dextrose 50% Injectable 50 milliLiter(s) IV Push every 15 minutes  dextrose 50% Injectable 25 milliLiter(s) IV Push every 15 minutes  insulin Infusion 1 Unit(s)/Hr IV Continuous <Continuous>  simvastatin 20 milliGRAM(s) Oral at bedtime    famotidine Injectable 20 milliGRAM(s) IV Push daily    dexmedetomidine Infusion 0.7 MICROgram(s)/kG/Hr IV Continuous <Continuous>  meperidine     Injectable 25 milliGRAM(s) IV Push once PRN  propofol Infusion 5 MICROgram(s)/kG/Min IV Continuous <Continuous>    ## Vitals:  T(C): 36.2 (18 @ 13:15), Max: 36.5 (18 @ 07:20)  HR: 78 (18 @ 15:30) (61 - 80)  BP: 177/88 (18 @ 07:20) (177/88 - 177/88)  BP(mean): --  RR: 712 (18 @ 15:30) (11 - 712)  SpO2: 100% (18 @ 15:30) (97% - 100%)  Wt(kg): --  Vent: Mode: AC/ CMV (Assist Control/ Continuous Mandatory Ventilation), RR (machine): 12, RR (patient): 12, TV (machine): 600, FiO2: 80, PEEP: 5, PIP: 22  ABG: ABG - ( 12 Mar 2018 14:13 )  pH: 7.32  /  pCO2: 43    /  pO2: 95    / HCO3: 21    / Base Excess: -4.6  /  SaO2: 97         @ 07:01  -  03 @ 15:49  --------------------------------------------------------  IN: 1040 mL / OUT: 600 mL / NET: 440 mL    ## P/E:  Gen: lying comfortably in bed in no apparent distress  Mouth: (+) ETT  Neck: R IJ cath  Lungs: CTA  Heart: Paced  Abd: Soft/ Decreased BS  Ext: No edema  Neuro: Lightly sedated did follow commands    CENTRAL LINE: [x ] YES [ ] NO  LOCATION:   DATE INSERTED:  REMOVE: [ ] YES [ ] NO      JAMIL: [x ] YES [ ] NO    DATE INSERTED:  REMOVE:  [ ] YES [ ] NO      A-LINE:  [x ] YES [ ] NO  LOCATION:   DATE INSERTED:  REMOVE:  [ ] YES [ ] NO  EXPLAIN:    CODE STATUS: [x ] full code  [ ] DNR  [ ] DNI  [ ] MOLST  Goals of care discussion: [ ] yes

## 2018-03-12 NOTE — BRIEF OPERATIVE NOTE - PROCEDURE
<<-----Click on this checkbox to enter Procedure AVR (aortic valve replacement)  03/12/2018    Active  [id number]

## 2018-03-12 NOTE — ASU PATIENT PROFILE, ADULT - PMH
Bicuspid aortic valve    Chronic back pain    GERD (gastroesophageal reflux disease)    High cholesterol    Hypertension    Postnasal drip    Severe aortic stenosis    Sleep apnea

## 2018-03-13 LAB
ALBUMIN SERPL ELPH-MCNC: 4 G/DL — SIGNIFICANT CHANGE UP (ref 3.3–5)
ALP SERPL-CCNC: 33 U/L — LOW (ref 40–120)
ALT FLD-CCNC: 18 U/L — SIGNIFICANT CHANGE UP (ref 10–45)
ANION GAP SERPL CALC-SCNC: 11 MMOL/L — SIGNIFICANT CHANGE UP (ref 5–17)
ANION GAP SERPL CALC-SCNC: 11 MMOL/L — SIGNIFICANT CHANGE UP (ref 5–17)
ANION GAP SERPL CALC-SCNC: 13 MMOL/L — SIGNIFICANT CHANGE UP (ref 5–17)
APTT BLD: 23.7 SEC — LOW (ref 27.5–37.4)
APTT BLD: 24.9 SEC — LOW (ref 27.5–37.4)
AST SERPL-CCNC: 40 U/L — SIGNIFICANT CHANGE UP (ref 10–40)
BASE EXCESS BLDA CALC-SCNC: -0.9 MMOL/L — SIGNIFICANT CHANGE UP (ref -2–3)
BILIRUB SERPL-MCNC: 0.5 MG/DL — SIGNIFICANT CHANGE UP (ref 0.2–1.2)
BUN SERPL-MCNC: 20 MG/DL — SIGNIFICANT CHANGE UP (ref 7–23)
BUN SERPL-MCNC: 21 MG/DL — SIGNIFICANT CHANGE UP (ref 7–23)
BUN SERPL-MCNC: 24 MG/DL — HIGH (ref 7–23)
CALCIUM SERPL-MCNC: 7.8 MG/DL — LOW (ref 8.4–10.5)
CALCIUM SERPL-MCNC: 8.2 MG/DL — LOW (ref 8.4–10.5)
CALCIUM SERPL-MCNC: 8.3 MG/DL — LOW (ref 8.4–10.5)
CHLORIDE SERPL-SCNC: 102 MMOL/L — SIGNIFICANT CHANGE UP (ref 96–108)
CHLORIDE SERPL-SCNC: 103 MMOL/L — SIGNIFICANT CHANGE UP (ref 96–108)
CHLORIDE SERPL-SCNC: 106 MMOL/L — SIGNIFICANT CHANGE UP (ref 96–108)
CO2 SERPL-SCNC: 23 MMOL/L — SIGNIFICANT CHANGE UP (ref 22–31)
CO2 SERPL-SCNC: 24 MMOL/L — SIGNIFICANT CHANGE UP (ref 22–31)
CO2 SERPL-SCNC: 25 MMOL/L — SIGNIFICANT CHANGE UP (ref 22–31)
CREAT SERPL-MCNC: 0.94 MG/DL — SIGNIFICANT CHANGE UP (ref 0.5–1.3)
CREAT SERPL-MCNC: 0.99 MG/DL — SIGNIFICANT CHANGE UP (ref 0.5–1.3)
CREAT SERPL-MCNC: 1.02 MG/DL — SIGNIFICANT CHANGE UP (ref 0.5–1.3)
GAS PNL BLDA: SIGNIFICANT CHANGE UP
GLUCOSE BLDC GLUCOMTR-MCNC: 123 MG/DL — HIGH (ref 70–99)
GLUCOSE BLDC GLUCOMTR-MCNC: 126 MG/DL — HIGH (ref 70–99)
GLUCOSE BLDC GLUCOMTR-MCNC: 130 MG/DL — HIGH (ref 70–99)
GLUCOSE BLDC GLUCOMTR-MCNC: 131 MG/DL — HIGH (ref 70–99)
GLUCOSE SERPL-MCNC: 125 MG/DL — HIGH (ref 70–99)
GLUCOSE SERPL-MCNC: 128 MG/DL — HIGH (ref 70–99)
GLUCOSE SERPL-MCNC: 143 MG/DL — HIGH (ref 70–99)
HCO3 BLDA-SCNC: 23 MMOL/L — SIGNIFICANT CHANGE UP (ref 21–28)
HCT VFR BLD CALC: 28.2 % — LOW (ref 39–50)
HCT VFR BLD CALC: 29.4 % — LOW (ref 39–50)
HCT VFR BLD CALC: 30.2 % — LOW (ref 39–50)
HGB BLD-MCNC: 8.7 G/DL — LOW (ref 13–17)
HGB BLD-MCNC: 9.2 G/DL — LOW (ref 13–17)
HGB BLD-MCNC: 9.2 G/DL — LOW (ref 13–17)
INR BLD: 1.19 — HIGH (ref 0.88–1.16)
INR BLD: 1.21 — HIGH (ref 0.88–1.16)
MAGNESIUM SERPL-MCNC: 2.5 MG/DL — SIGNIFICANT CHANGE UP (ref 1.6–2.6)
MAGNESIUM SERPL-MCNC: 2.5 MG/DL — SIGNIFICANT CHANGE UP (ref 1.6–2.6)
MAGNESIUM SERPL-MCNC: 2.6 MG/DL — SIGNIFICANT CHANGE UP (ref 1.6–2.6)
MCHC RBC-ENTMCNC: 26.9 PG — LOW (ref 27–34)
MCHC RBC-ENTMCNC: 27 PG — SIGNIFICANT CHANGE UP (ref 27–34)
MCHC RBC-ENTMCNC: 27.1 PG — SIGNIFICANT CHANGE UP (ref 27–34)
MCHC RBC-ENTMCNC: 30.5 G/DL — LOW (ref 32–36)
MCHC RBC-ENTMCNC: 30.9 G/DL — LOW (ref 32–36)
MCHC RBC-ENTMCNC: 31.3 G/DL — LOW (ref 32–36)
MCV RBC AUTO: 86.7 FL — SIGNIFICANT CHANGE UP (ref 80–100)
MCV RBC AUTO: 87.6 FL — SIGNIFICANT CHANGE UP (ref 80–100)
MCV RBC AUTO: 88.3 FL — SIGNIFICANT CHANGE UP (ref 80–100)
PCO2 BLDA: 36 MMHG — SIGNIFICANT CHANGE UP (ref 35–48)
PH BLDA: 7.42 — SIGNIFICANT CHANGE UP (ref 7.35–7.45)
PHOSPHATE SERPL-MCNC: 1.9 MG/DL — LOW (ref 2.5–4.5)
PHOSPHATE SERPL-MCNC: 2.1 MG/DL — LOW (ref 2.5–4.5)
PLATELET # BLD AUTO: 52 K/UL — LOW (ref 150–400)
PLATELET # BLD AUTO: 77 K/UL — LOW (ref 150–400)
PLATELET # BLD AUTO: 78 K/UL — LOW (ref 150–400)
PO2 BLDA: 112 MMHG — HIGH (ref 83–108)
POTASSIUM SERPL-MCNC: 3.9 MMOL/L — SIGNIFICANT CHANGE UP (ref 3.5–5.3)
POTASSIUM SERPL-MCNC: 4 MMOL/L — SIGNIFICANT CHANGE UP (ref 3.5–5.3)
POTASSIUM SERPL-MCNC: 4.3 MMOL/L — SIGNIFICANT CHANGE UP (ref 3.5–5.3)
POTASSIUM SERPL-SCNC: 3.9 MMOL/L — SIGNIFICANT CHANGE UP (ref 3.5–5.3)
POTASSIUM SERPL-SCNC: 4 MMOL/L — SIGNIFICANT CHANGE UP (ref 3.5–5.3)
POTASSIUM SERPL-SCNC: 4.3 MMOL/L — SIGNIFICANT CHANGE UP (ref 3.5–5.3)
PROT SERPL-MCNC: 6.1 G/DL — SIGNIFICANT CHANGE UP (ref 6–8.3)
PROTHROM AB SERPL-ACNC: 13.2 SEC — HIGH (ref 9.8–12.7)
PROTHROM AB SERPL-ACNC: 13.5 SEC — HIGH (ref 9.8–12.7)
RBC # BLD: 3.22 M/UL — LOW (ref 4.2–5.8)
RBC # BLD: 3.39 M/UL — LOW (ref 4.2–5.8)
RBC # BLD: 3.42 M/UL — LOW (ref 4.2–5.8)
RBC # FLD: 12.9 % — SIGNIFICANT CHANGE UP (ref 10.3–16.9)
RBC # FLD: 13.4 % — SIGNIFICANT CHANGE UP (ref 10.3–16.9)
RBC # FLD: 13.6 % — SIGNIFICANT CHANGE UP (ref 10.3–16.9)
SAO2 % BLDA: 98 % — SIGNIFICANT CHANGE UP (ref 95–100)
SODIUM SERPL-SCNC: 139 MMOL/L — SIGNIFICANT CHANGE UP (ref 135–145)
SODIUM SERPL-SCNC: 139 MMOL/L — SIGNIFICANT CHANGE UP (ref 135–145)
SODIUM SERPL-SCNC: 140 MMOL/L — SIGNIFICANT CHANGE UP (ref 135–145)
WBC # BLD: 12 K/UL — HIGH (ref 3.8–10.5)
WBC # BLD: 12.8 K/UL — HIGH (ref 3.8–10.5)
WBC # BLD: 7.7 K/UL — SIGNIFICANT CHANGE UP (ref 3.8–10.5)
WBC # FLD AUTO: 12 K/UL — HIGH (ref 3.8–10.5)
WBC # FLD AUTO: 12.8 K/UL — HIGH (ref 3.8–10.5)
WBC # FLD AUTO: 7.7 K/UL — SIGNIFICANT CHANGE UP (ref 3.8–10.5)

## 2018-03-13 PROCEDURE — 99291 CRITICAL CARE FIRST HOUR: CPT

## 2018-03-13 PROCEDURE — 99292 CRITICAL CARE ADDL 30 MIN: CPT

## 2018-03-13 PROCEDURE — 71045 X-RAY EXAM CHEST 1 VIEW: CPT | Mod: 26

## 2018-03-13 RX ORDER — ALPRAZOLAM 0.25 MG
0.25 TABLET ORAL THREE TIMES A DAY
Qty: 0 | Refills: 0 | Status: DISCONTINUED | OUTPATIENT
Start: 2018-03-13 | End: 2018-03-18

## 2018-03-13 RX ORDER — DOCUSATE SODIUM 100 MG
100 CAPSULE ORAL THREE TIMES A DAY
Qty: 0 | Refills: 0 | Status: DISCONTINUED | OUTPATIENT
Start: 2018-03-13 | End: 2018-03-18

## 2018-03-13 RX ORDER — ALBUMIN HUMAN 25 %
250 VIAL (ML) INTRAVENOUS ONCE
Qty: 0 | Refills: 0 | Status: COMPLETED | OUTPATIENT
Start: 2018-03-13 | End: 2018-03-13

## 2018-03-13 RX ORDER — ONDANSETRON 8 MG/1
4 TABLET, FILM COATED ORAL EVERY 6 HOURS
Qty: 0 | Refills: 0 | Status: DISCONTINUED | OUTPATIENT
Start: 2018-03-13 | End: 2018-03-18

## 2018-03-13 RX ORDER — CEFAZOLIN SODIUM 1 G
2000 VIAL (EA) INJECTION EVERY 8 HOURS
Qty: 0 | Refills: 0 | Status: COMPLETED | OUTPATIENT
Start: 2018-03-13 | End: 2018-03-13

## 2018-03-13 RX ORDER — SENNA PLUS 8.6 MG/1
1 TABLET ORAL AT BEDTIME
Qty: 0 | Refills: 0 | Status: DISCONTINUED | OUTPATIENT
Start: 2018-03-13 | End: 2018-03-18

## 2018-03-13 RX ORDER — FENTANYL CITRATE 50 UG/ML
25 INJECTION INTRAVENOUS ONCE
Qty: 0 | Refills: 0 | Status: DISCONTINUED | OUTPATIENT
Start: 2018-03-13 | End: 2018-03-13

## 2018-03-13 RX ORDER — NOREPINEPHRINE BITARTRATE/D5W 8 MG/250ML
0.02 PLASTIC BAG, INJECTION (ML) INTRAVENOUS
Qty: 8 | Refills: 0 | Status: DISCONTINUED | OUTPATIENT
Start: 2018-03-13 | End: 2018-03-13

## 2018-03-13 RX ORDER — ACETAMINOPHEN 500 MG
1000 TABLET ORAL ONCE
Qty: 0 | Refills: 0 | Status: COMPLETED | OUTPATIENT
Start: 2018-03-13 | End: 2018-03-13

## 2018-03-13 RX ORDER — OXYCODONE AND ACETAMINOPHEN 5; 325 MG/1; MG/1
1 TABLET ORAL EVERY 6 HOURS
Qty: 0 | Refills: 0 | Status: DISCONTINUED | OUTPATIENT
Start: 2018-03-13 | End: 2018-03-18

## 2018-03-13 RX ORDER — FAMOTIDINE 10 MG/ML
20 INJECTION INTRAVENOUS DAILY
Qty: 0 | Refills: 0 | Status: DISCONTINUED | OUTPATIENT
Start: 2018-03-13 | End: 2018-03-13

## 2018-03-13 RX ORDER — LORATADINE 10 MG/1
10 TABLET ORAL DAILY
Qty: 0 | Refills: 0 | Status: DISCONTINUED | OUTPATIENT
Start: 2018-03-13 | End: 2018-03-18

## 2018-03-13 RX ORDER — PANTOPRAZOLE SODIUM 20 MG/1
40 TABLET, DELAYED RELEASE ORAL
Qty: 0 | Refills: 0 | Status: DISCONTINUED | OUTPATIENT
Start: 2018-03-13 | End: 2018-03-18

## 2018-03-13 RX ORDER — FENTANYL CITRATE 50 UG/ML
12.5 INJECTION INTRAVENOUS ONCE
Qty: 0 | Refills: 0 | Status: DISCONTINUED | OUTPATIENT
Start: 2018-03-13 | End: 2018-03-13

## 2018-03-13 RX ORDER — FUROSEMIDE 40 MG
20 TABLET ORAL ONCE
Qty: 0 | Refills: 0 | Status: COMPLETED | OUTPATIENT
Start: 2018-03-13 | End: 2018-03-13

## 2018-03-13 RX ORDER — ATORVASTATIN CALCIUM 80 MG/1
20 TABLET, FILM COATED ORAL AT BEDTIME
Qty: 0 | Refills: 0 | Status: DISCONTINUED | OUTPATIENT
Start: 2018-03-13 | End: 2018-03-13

## 2018-03-13 RX ORDER — POTASSIUM CHLORIDE 20 MEQ
20 PACKET (EA) ORAL ONCE
Qty: 0 | Refills: 0 | Status: COMPLETED | OUTPATIENT
Start: 2018-03-13 | End: 2018-03-13

## 2018-03-13 RX ORDER — ZOLPIDEM TARTRATE 10 MG/1
5 TABLET ORAL AT BEDTIME
Qty: 0 | Refills: 0 | Status: DISCONTINUED | OUTPATIENT
Start: 2018-03-13 | End: 2018-03-18

## 2018-03-13 RX ORDER — MONTELUKAST 4 MG/1
10 TABLET, CHEWABLE ORAL DAILY
Qty: 0 | Refills: 0 | Status: DISCONTINUED | OUTPATIENT
Start: 2018-03-13 | End: 2018-03-18

## 2018-03-13 RX ORDER — ALPRAZOLAM 0.25 MG
0.25 TABLET ORAL ONCE
Qty: 0 | Refills: 0 | Status: DISCONTINUED | OUTPATIENT
Start: 2018-03-13 | End: 2018-03-13

## 2018-03-13 RX ADMIN — Medication 2000 MILLIGRAM(S): at 16:26

## 2018-03-13 RX ADMIN — ONDANSETRON 4 MILLIGRAM(S): 8 TABLET, FILM COATED ORAL at 00:14

## 2018-03-13 RX ADMIN — OXYCODONE AND ACETAMINOPHEN 1 TABLET(S): 5; 325 TABLET ORAL at 15:08

## 2018-03-13 RX ADMIN — Medication 100 MILLIGRAM(S): at 00:00

## 2018-03-13 RX ADMIN — Medication 0.25 MILLIGRAM(S): at 12:30

## 2018-03-13 RX ADMIN — FAMOTIDINE 20 MILLIGRAM(S): 10 INJECTION INTRAVENOUS at 11:35

## 2018-03-13 RX ADMIN — Medication 0.25 MILLIGRAM(S): at 16:28

## 2018-03-13 RX ADMIN — PANTOPRAZOLE SODIUM 40 MILLIGRAM(S): 20 TABLET, DELAYED RELEASE ORAL at 14:08

## 2018-03-13 RX ADMIN — HEPARIN SODIUM 5000 UNIT(S): 5000 INJECTION INTRAVENOUS; SUBCUTANEOUS at 07:30

## 2018-03-13 RX ADMIN — Medication 20 MILLIGRAM(S): at 14:08

## 2018-03-13 RX ADMIN — Medication 81 MILLIGRAM(S): at 11:35

## 2018-03-13 RX ADMIN — Medication 2000 MILLIGRAM(S): at 23:01

## 2018-03-13 RX ADMIN — Medication 100 MILLIGRAM(S): at 21:05

## 2018-03-13 RX ADMIN — Medication 100 MILLIGRAM(S): at 08:00

## 2018-03-13 RX ADMIN — SIMVASTATIN 20 MILLIGRAM(S): 20 TABLET, FILM COATED ORAL at 21:07

## 2018-03-13 RX ADMIN — Medication 1000 MILLIGRAM(S): at 11:55

## 2018-03-13 RX ADMIN — ZOLPIDEM TARTRATE 5 MILLIGRAM(S): 10 TABLET ORAL at 21:05

## 2018-03-13 RX ADMIN — FENTANYL CITRATE 12.5 MICROGRAM(S): 50 INJECTION INTRAVENOUS at 17:06

## 2018-03-13 RX ADMIN — Medication 0.25 MILLIGRAM(S): at 21:05

## 2018-03-13 RX ADMIN — FENTANYL CITRATE 25 MICROGRAM(S): 50 INJECTION INTRAVENOUS at 03:00

## 2018-03-13 RX ADMIN — OXYCODONE AND ACETAMINOPHEN 1 TABLET(S): 5; 325 TABLET ORAL at 14:08

## 2018-03-13 RX ADMIN — SENNA PLUS 1 TABLET(S): 8.6 TABLET ORAL at 21:05

## 2018-03-13 RX ADMIN — OXYCODONE AND ACETAMINOPHEN 1 TABLET(S): 5; 325 TABLET ORAL at 22:45

## 2018-03-13 RX ADMIN — MONTELUKAST 10 MILLIGRAM(S): 4 TABLET, CHEWABLE ORAL at 14:08

## 2018-03-13 RX ADMIN — FENTANYL CITRATE 25 MICROGRAM(S): 50 INJECTION INTRAVENOUS at 03:15

## 2018-03-13 RX ADMIN — Medication 100 MILLIGRAM(S): at 14:08

## 2018-03-13 RX ADMIN — Medication 125 MILLILITER(S): at 02:46

## 2018-03-13 RX ADMIN — DEXMEDETOMIDINE HYDROCHLORIDE IN 0.9% SODIUM CHLORIDE 18.66 MICROGRAM(S)/KG/HR: 4 INJECTION INTRAVENOUS at 08:00

## 2018-03-13 RX ADMIN — Medication 100 MILLIEQUIVALENT(S): at 15:41

## 2018-03-13 RX ADMIN — Medication 125 MILLILITER(S): at 00:45

## 2018-03-13 RX ADMIN — OXYCODONE AND ACETAMINOPHEN 1 TABLET(S): 5; 325 TABLET ORAL at 21:29

## 2018-03-13 RX ADMIN — FENTANYL CITRATE 12.5 MICROGRAM(S): 50 INJECTION INTRAVENOUS at 09:35

## 2018-03-13 RX ADMIN — Medication 400 MILLIGRAM(S): at 11:35

## 2018-03-13 RX ADMIN — FENTANYL CITRATE 12.5 MICROGRAM(S): 50 INJECTION INTRAVENOUS at 09:36

## 2018-03-13 RX ADMIN — FENTANYL CITRATE 12.5 MICROGRAM(S): 50 INJECTION INTRAVENOUS at 17:21

## 2018-03-13 RX ADMIN — HEPARIN SODIUM 5000 UNIT(S): 5000 INJECTION INTRAVENOUS; SUBCUTANEOUS at 14:08

## 2018-03-13 RX ADMIN — HEPARIN SODIUM 5000 UNIT(S): 5000 INJECTION INTRAVENOUS; SUBCUTANEOUS at 21:05

## 2018-03-13 NOTE — PROGRESS NOTE ADULT - ASSESSMENT
A/P:  62 year old M PMHx HTN, HLD, GERD, sleep apnea (not on CPAP), chronic back pain, followed by Dr. Blandon as outpatient for bicuspid aortic valve, aortic stenosis and ascending aortic aneurysm. On 3/12/18 patient underwent Mini AVR, EF 40-45%, intra op given pump blood.  Arrived to CT ICU intubated on norepi, which was eventually weaned and extubated. POD1 A sensed, V paced at 80 with no underlying rhythm, then CHB, EP consulted, likely PPM tomorrow/thursday. Transferred to [facility].       Neurovascular: No delirium. Pain well controlled with current regimen.  -Continue percocet PRN pain, xanax PRN anxiety, zofran PRN nausea, Ambien PRN insomnia    Cardiovascular: Hemodynamically stable. HR controlled.  - Hx bicuspid valve, severe AS, s/p AVR EF 40%  - continue ASA 81mg daily, lipitor 20mg QHS  -V pacing for CHB, EP following appreciate recs, PPM tomorrow/thursday    Respiratory: 02 Sat = 96% on HFNC. Hx TYLER,   -If on oxygen wean to RA from for O2 Sat > 93%.  -Encourage C+DB and Use of IS 10x / hr while awake.  -CXR- questionable   -Hx TYLER- BiPAP tonight    GI: Stable.  -NPO after MN.  -PPX.  -PO Diet.    Renal / :  -Monitor renal function.  -Monitor I/O's.    Endocrine:    -A1c.  -TSH.    Hematologic:  -CBC.  -Coagulation Panel.    ID:  -Tempature.  -CBC.  -Observe for SIRS/Sepsis Syndrome.    Prophylaxis:  -DVT prophylaxis with 5000 SubQ Heparin q8h.  -SCD's    Disposition:  -ICU for frequent monitoring. A/P:  62 year old M PMHx HTN, HLD, GERD, sleep apnea (not on CPAP), chronic back pain, followed by Dr. Blandon as outpatient for bicuspid aortic valve, aortic stenosis and ascending aortic aneurysm. On 3/12/18 patient underwent Mini AVR, EF 40-45%, intra op given pump blood.  Arrived to CT ICU intubated on norepi, which was eventually weaned and extubated. POD1 A sensed, V paced at 80 with no underlying rhythm, then CHB, EP consulted, likely PPM tomorrow/thursday. Transferred to [facility] ICU.       Neurovascular: No delirium. Pain well controlled with current regimen.  -Continue percocet PRN pain, xanax PRN anxiety, zofran PRN nausea, Ambien PRN insomnia    Cardiovascular: Hemodynamically stable. HR controlled.  - Hx bicuspid valve, severe AS, s/p AVR EF 40%  - continue ASA 81mg daily, lipitor 20mg QHS  -V pacing for CHB, EP following appreciate recs, PPM tomorrow/thursday    Respiratory: 02 Sat = 96% on HFNC. Hx TYLER,   -If on oxygen wean to RA from for O2 Sat > 93%.  -Encourage C+DB and Use of IS 10x / hr while awake.  -CXR- read at R apical PTX, f/u AM CXR.   -Hx TYLER- BiPAP tonight    GI: Stable.  -NPO after MN for PPM tomorrow  -continue protonix for GI PPX.  -Bowel regimen   -PO Diet.    Renal / : BUN/Cr 21/0.94  -Monitor renal function.  -Monitor I/O's.  -+ deedee    Endocrine:    -A1c 5.4  -TSH WNL    Hematologic: H&H 9/29  -f/u AM CBC.    ID: afebrile, WBC 12  -complete periop abx  -Observe for SIRS/Sepsis Syndrome.    Prophylaxis:  -DVT prophylaxis with 5000 SubQ Heparin q8h.  -SCD's    Disposition:  -[facility] ICU A/P:  62 year old M PMHx HTN, HLD, GERD, sleep apnea (not on CPAP), chronic back pain, followed by Dr. Blandon as outpatient for bicuspid aortic valve, aortic stenosis and ascending aortic aneurysm. On 3/12/18 patient underwent Mini AVR, EF 40-45%, intra op given pump blood.  Arrived to CT ICU intubated on norepi, which was eventually weaned and extubated. POD1 A sensed, V paced at 80 with no underlying rhythm, then CHB, EP consulted, likely PPM tomorrow/thursday. Transferred to [facility] ICU.       Neurovascular: No delirium. Pain well controlled with current regimen.  -Continue percocet PRN pain, xanax PRN anxiety, zofran PRN nausea, Ambien PRN insomnia    Cardiovascular: Hemodynamically stable. HR controlled.  - Hx bicuspid valve, severe AS, s/p AVR EF 40%  - continue ASA 81mg daily, lipitor 20mg QHS  -V pacing for CHB, EP following appreciate recs, PPM tomorrow/thursday  -monitor HR/BP/tele    Respiratory: 02 Sat = 96% on HFNC. Hx TYLER  -If on oxygen wean to RA from for O2 Sat > 93%.  -Encourage C+DB and Use of IS 10x / hr while awake.  -CXR- read at R apical PTX, f/u AM CXR.   -Hx TYLER- BiPAP tonight    GI: Stable.  -NPO after MN for PPM tomorrow  -continue protonix for GI PPX.  -Bowel regimen   -PO Diet.    Renal / : BUN/Cr 21/0.94  -Monitor renal function.  -Monitor I/O's.  -+ deedee    Endocrine:    -A1c 5.4  -TSH WNL    Hematologic: H&H 9/29  -f/u AM CBC.    ID: afebrile, WBC 12  -complete periop abx  -Observe for SIRS/Sepsis Syndrome.    Prophylaxis:  -DVT prophylaxis with 5000 SubQ Heparin q8h.  -SCD's    Disposition:  -[facility] ICU

## 2018-03-13 NOTE — PROGRESS NOTE ADULT - SUBJECTIVE AND OBJECTIVE BOX
CTICU  CRITICAL  CARE  attending     Hand off received 					   Pertinent clinical, laboratory, radiographic, hemodynamic, echocardiographic, respiratory data, microbiologic data and chart were reviewed and analyzed frequently throughout the course of the day and night  Patient seen and examined with CTS/ SH attending at bedside  Pt is a 62y , Male, HEALTH ISSUES - PROBLEM Dx:  Severe aortic stenosis: Severe aortic stenosis      , FAMILY HISTORY:  Family history of cancer (Father)  Family history of early CAD (Mother)  Hypertension (Mother)  PAST MEDICAL & SURGICAL HISTORY:  Postnasal drip  Chronic back pain  GERD (gastroesophageal reflux disease)  Severe aortic stenosis  Bicuspid aortic valve  Sleep apnea  High cholesterol  Hypertension  H/O foot surgery  History of appendectomy  History of back surgery    Patient is a 62y old  Male who presents with a chief complaint of minimally invasive aortic valve replacement (09 Mar 2018 12:33)      14 system review was unremarkable  acute changes include acute respiratory failure  Vital signs, hemodynamic and respiratory parameters were reviewed from the bedside nursing flowsheet.  ICU Vital Signs Last 24 Hrs  T(C): 36.3 (13 Mar 2018 17:32), Max: 36.8 (13 Mar 2018 05:00)  T(F): 97.4 (13 Mar 2018 17:32), Max: 98.3 (13 Mar 2018 05:00)  HR: 102 (13 Mar 2018 18:00) (70 - 102)  BP: 97/55 (12 Mar 2018 21:00) (97/55 - 97/55)  BP(mean): 67 (12 Mar 2018 21:00) (67 - 67)  ABP: 112/88 (13 Mar 2018 18:00) (92/66 - 136/74)  ABP(mean): 98 (13 Mar 2018 18:00) (66 - 98)  RR: 17 (13 Mar 2018 18:00) (9 - 22)  SpO2: 99% (13 Mar 2018 18:00) (93% - 99%)    Adult Advanced Hemodynamics Last 24 Hrs  CVP(mm Hg): 10 (13 Mar 2018 12:00) (0 - 17)  CVP(cm H2O): --  CO: --  CI: --  PA: --  PA(mean): --  PCWP: --  SVR: --  SVRI: --  PVR: --  PVRI: --, ABG - ( 13 Mar 2018 12:57 )  pH: 7.46  /  pCO2: 33    /  pO2: 79    / HCO3: 23    / Base Excess: -0.8  /  SaO2: 96                  Intake and output was reviewed and the fluid balance was calculated  Daily     Daily   I&O's Summary    12 Mar 2018 07:01  -  13 Mar 2018 07:00  --------------------------------------------------------  IN: 2092.3 mL / OUT: 2410 mL / NET: -317.7 mL    13 Mar 2018 07:01  -  13 Mar 2018 19:12  --------------------------------------------------------  IN: 725.4 mL / OUT: 1170 mL / NET: -444.6 mL        All lines and drain sites were assessed  [facility]      POCT Blood Glucose.: 131 mg/dL (13 Mar 2018 15:12)    No acute change in mental status  Auscultation of the chest reveals equal bs  Abdomen is soft  Extremities are warm and well perfused  Wounds appear clean and unremarkable  Antibiotics are periop    labs  CBC Full  -  ( 13 Mar 2018 16:20 )  WBC Count : 12.8 K/uL  Hemoglobin : 9.2 g/dL  Hematocrit : 30.2 %  Platelet Count - Automated : 77 K/uL  Mean Cell Volume : 88.3 fL  Mean Cell Hemoglobin : 26.9 pg  Mean Cell Hemoglobin Concentration : 30.5 g/dL  Auto Neutrophil # : x  Auto Lymphocyte # : x  Auto Monocyte # : x  Auto Eosinophil # : x  Auto Basophil # : x  Auto Neutrophil % : x  Auto Lymphocyte % : x  Auto Monocyte % : x  Auto Eosinophil % : x  Auto Basophil % : x    03-13    139  |  102  |  24<H>  ----------------------------<  125<H>  3.9   |  24  |  1.02    Ca    8.3<L>      13 Mar 2018 16:20  Phos  1.9     03-13  Mg     2.6     03-13    TPro  6.1  /  Alb  4.0  /  TBili  0.5  /  DBili  x   /  AST  40  /  ALT  18  /  AlkPhos  33<L>  03-13    PT/INR - ( 13 Mar 2018 13:10 )   PT: 13.2 sec;   INR: 1.19          PTT - ( 13 Mar 2018 13:10 )  PTT:23.7 sec  The current medications were reviewed   MEDICATIONS  (STANDING):  aspirin enteric coated 81 milliGRAM(s) Oral daily  ceFAZolin  Injectable. 2000 milliGRAM(s) IV Push every 8 hours  docusate sodium 100 milliGRAM(s) Oral three times a day  heparin  Injectable 5000 Unit(s) SubCutaneous every 8 hours  loratadine 10 milliGRAM(s) Oral daily  montelukast 10 milliGRAM(s) Oral daily  pantoprazole    Tablet 40 milliGRAM(s) Oral before breakfast  senna 1 Tablet(s) Oral at bedtime  simvastatin 20 milliGRAM(s) Oral at bedtime  sodium chloride 0.45%. 1000 milliLiter(s) (10 mL/Hr) IV Continuous <Continuous>    MEDICATIONS  (PRN):  ALPRAZolam 0.25 milliGRAM(s) Oral three times a day PRN anxiety  ondansetron Injectable 4 milliGRAM(s) IV Push every 6 hours PRN Nausea and/or Vomiting  oxyCODONE    5 mG/acetaminophen 325 mG 1 Tablet(s) Oral every 6 hours PRN Moderate Pain (4 - 6)  zolpidem 5 milliGRAM(s) Oral at bedtime PRN Insomnia       PROBLEM LIST/ ASSESSMENT:  HEALTH ISSUES - PROBLEM Dx:  Severe aortic stenosis: Severe aortic stenosis      ,   Patient is a 62y old  Male who presents with a chief complaint of minimally invasive aortic valve replacement (09 Mar 2018 12:33)     s/p ohs  acute changes include acute respiratory failure    My plan includes :  close hemodynamic, ventilatory and drain monitoring and management per post op routine    Monitor for arrhythmias and monitor parameters for organ perfusion  monitor neurologic status  Head of the bed should remain elevated to 45 deg .   chest PT and IS will be encouraged  monitor adequacy of oxygenation and ventilation and attempt to wean oxygen  Nutritional goals will be met using po eventually , ensure adequate caloric intake and montior the same  Stress ulcer and VTE prophylaxis will be achieved    Glycemic control is satisfactory  Electrolytes have been repleted as necessary and wound care has been carried out. Pain control has been achieved.   agressive physical therapy and early mobility and ambulation goals will be met   The family was updated about the course and plan  CRITICAL CARE TIME SPENT in evaluation and management, reassessments, review and interpretation of labs and x-rays, ventilator and hemodynamic management, formulating a plan and coordinating care: ___90____ MIN.  Time does not include procedural time.  CTICU ATTENDING     					    Maximo Cespedes MD

## 2018-03-13 NOTE — PHYSICAL THERAPY INITIAL EVALUATION ADULT - GENERAL OBSERVATIONS, REHAB EVAL
patient received seated out of bed with no acute distress. +EKG, +central line, +ruddy, +mark, +SCDs, +NC 6L/min

## 2018-03-13 NOTE — PROGRESS NOTE ADULT - SUBJECTIVE AND OBJECTIVE BOX
Transfer from      Operation / Date: 3/12/18 Mini AVR EF 40-45%    SUBJECTIVE ASSESSMENT:  62y Male         Vital Signs Last 24 Hrs  T(C): 36.2 (13 Mar 2018 13:34), Max: 36.8 (13 Mar 2018 05:00)  T(F): 97.2 (13 Mar 2018 13:34), Max: 98.3 (13 Mar 2018 05:00)  HR: 96 (13 Mar 2018 13:13) (70 - 102)  BP: 97/55 (12 Mar 2018 21:00) (97/55 - 97/55)  BP(mean): 67 (12 Mar 2018 21:00) (67 - 67)  RR: 18 (13 Mar 2018 13:13) (7 - 712)  SpO2: 96% (13 Mar 2018 13:13) (93% - 100%)  I&O's Detail    12 Mar 2018 07:01  -  13 Mar 2018 07:00  --------------------------------------------------------  IN:    Albumin 5%  - 250 mL: 1500 mL    dexmedetomidine Infusion: 142.3 mL    IV PiggyBack: 250 mL    norepinephrine Infusion: 10 mL    sodium chloride 0.45%.: 190 mL  Total IN: 2092.3 mL    OUT:    Bulb: 490 mL    Indwelling Catheter - Urethral: 1920 mL  Total OUT: 2410 mL    Total NET: -317.7 mL      13 Mar 2018 07:01  -  13 Mar 2018 14:00  --------------------------------------------------------  IN:    dexmedetomidine Infusion: 5.4 mL    sodium chloride 0.45%.: 40 mL  Total IN: 45.4 mL    OUT:    Bulb: 75 mL    Indwelling Catheter - Urethral: 370 mL  Total OUT: 445 mL    Total NET: -399.6 mL          CHEST TUBE:  Yes/No. AIR LEAKS: Yes/No. Suction / H2O SEAL.   ELYSSA DRAIN:  Yes/No.  EPICARDIAL WIRES: Yes/No.  TIE DOWNS: Yes/No.  JAMIL: Yes/No.    PHYSICAL EXAM:    General:     Neurological:    Cardiovascular:    Respiratory:    Gastrointestinal:    Extremities:    Vascular:    Incision Sites:    LABS:                        9.2    12.0  )-----------( 78       ( 13 Mar 2018 13:11 )             29.4       COUMADIN:  Yes/No. REASON: .    PT/INR - ( 13 Mar 2018 13:10 )   PT: 13.2 sec;   INR: 1.19          PTT - ( 13 Mar 2018 13:10 )  PTT:23.7 sec    03-13    139  |  103  |  21  ----------------------------<  128<H>  4.0   |  25  |  0.94    Ca    8.2<L>      13 Mar 2018 13:12  Phos  2.1     03-13  Mg     2.5     03-13    TPro  6.1  /  Alb  4.0  /  TBili  0.5  /  DBili  x   /  AST  40  /  ALT  18  /  AlkPhos  33<L>  03-13          MEDICATIONS  (STANDING):  aspirin enteric coated 81 milliGRAM(s) Oral daily  atorvastatin 20 milliGRAM(s) Oral at bedtime  ceFAZolin  Injectable. 2000 milliGRAM(s) IV Push every 8 hours  docusate sodium 100 milliGRAM(s) Oral three times a day  furosemide   Injectable 20 milliGRAM(s) IV Push once  heparin  Injectable 5000 Unit(s) SubCutaneous every 8 hours  loratadine 10 milliGRAM(s) Oral daily  montelukast 10 milliGRAM(s) Oral daily  pantoprazole    Tablet 40 milliGRAM(s) Oral before breakfast  senna 1 Tablet(s) Oral at bedtime  simvastatin 20 milliGRAM(s) Oral at bedtime  sodium chloride 0.45%. 1000 milliLiter(s) (10 mL/Hr) IV Continuous <Continuous>    MEDICATIONS  (PRN):  ALPRAZolam 0.25 milliGRAM(s) Oral three times a day PRN anxiety  ondansetron Injectable 4 milliGRAM(s) IV Push every 6 hours PRN Nausea and/or Vomiting  oxyCODONE    5 mG/acetaminophen 325 mG 1 Tablet(s) Oral every 6 hours PRN Moderate Pain (4 - 6)  zolpidem 5 milliGRAM(s) Oral at bedtime PRN Insomnia        RADIOLOGY & ADDITIONAL TESTS:  < from: Xray Chest 1 View- PORTABLE-Routine (03.13.18 @ 04:40) >  IMPRESSION:  Newly developing small right apical pneumothorax.  Additional findings as above.    < end of copied text > Transfer from      Operation / Date: 3/12/18 Mini AVR EF 40-45%    SUBJECTIVE ASSESSMENT:  62y Male seen and examined. Patient reports much improvement in SOB with HFNC.  Also reports incisional pain, controlled with meds.  Is ambulating, pulling 1000cc on IS, tolerating PO diet, No BM since surgery.         Vital Signs Last 24 Hrs  T(C): 36.2 (13 Mar 2018 13:34), Max: 36.8 (13 Mar 2018 05:00)  T(F): 97.2 (13 Mar 2018 13:34), Max: 98.3 (13 Mar 2018 05:00)  HR: 96 (13 Mar 2018 13:13) (70 - 102)  BP: 97/55 (12 Mar 2018 21:00) (97/55 - 97/55)  BP(mean): 67 (12 Mar 2018 21:00) (67 - 67)  RR: 18 (13 Mar 2018 13:13) (7 - 712)  SpO2: 96% (13 Mar 2018 13:13) (93% - 100%)  I&O's Detail    12 Mar 2018 07:01  -  13 Mar 2018 07:00  --------------------------------------------------------  IN:    Albumin 5%  - 250 mL: 1500 mL    dexmedetomidine Infusion: 142.3 mL    IV PiggyBack: 250 mL    norepinephrine Infusion: 10 mL    sodium chloride 0.45%.: 190 mL  Total IN: 2092.3 mL    OUT:    Bulb: 490 mL    Indwelling Catheter - Urethral: 1920 mL  Total OUT: 2410 mL    Total NET: -317.7 mL      13 Mar 2018 07:01  -  13 Mar 2018 14:00  --------------------------------------------------------  IN:    dexmedetomidine Infusion: 5.4 mL    sodium chloride 0.45%.: 40 mL  Total IN: 45.4 mL    OUT:    Bulb: 75 mL    Indwelling Catheter - Urethral: 370 mL  Total OUT: 445 mL    Total NET: -399.6 mL          CHEST TUBE:  NO  JOSHUA DRAIN:  Yes x 2  EPICARDIAL WIRES: Yes  TIE DOWNS: Yes  JAMIL: Yes    PHYSICAL EXAM:    General: Patient lying comfortably in bed, no acute distress     Neurological: Alert and oriented. No focal neurological deficits     Cardiovascular: S1S2, Paced rhythm, no murmurs appreciated on exam     Respiratory: Decreased R base, no wheeze/rhonchi/rales    Gastrointestinal: Abdomen soft, non tender, non distended     Extremities: Warm and well perfused. trace b/l LE edema, or calf tenderness     Vascular: 2+ Peripheral pulses     Incision Sites: mini MSI: + mepilex.  R IJ: CDI.  PW/Joshua sites: CDI.     LABS:                        9.2    12.0  )-----------( 78       ( 13 Mar 2018 13:11 )             29.4       COUMADIN:  No    PT/INR - ( 13 Mar 2018 13:10 )   PT: 13.2 sec;   INR: 1.19          PTT - ( 13 Mar 2018 13:10 )  PTT:23.7 sec    03-13    139  |  103  |  21  ----------------------------<  128<H>  4.0   |  25  |  0.94    Ca    8.2<L>      13 Mar 2018 13:12  Phos  2.1     03-13  Mg     2.5     03-13    TPro  6.1  /  Alb  4.0  /  TBili  0.5  /  DBili  x   /  AST  40  /  ALT  18  /  AlkPhos  33<L>  03-13          MEDICATIONS  (STANDING):  aspirin enteric coated 81 milliGRAM(s) Oral daily  atorvastatin 20 milliGRAM(s) Oral at bedtime  ceFAZolin  Injectable. 2000 milliGRAM(s) IV Push every 8 hours  docusate sodium 100 milliGRAM(s) Oral three times a day  furosemide   Injectable 20 milliGRAM(s) IV Push once  heparin  Injectable 5000 Unit(s) SubCutaneous every 8 hours  loratadine 10 milliGRAM(s) Oral daily  montelukast 10 milliGRAM(s) Oral daily  pantoprazole    Tablet 40 milliGRAM(s) Oral before breakfast  senna 1 Tablet(s) Oral at bedtime  simvastatin 20 milliGRAM(s) Oral at bedtime  sodium chloride 0.45%. 1000 milliLiter(s) (10 mL/Hr) IV Continuous <Continuous>    MEDICATIONS  (PRN):  ALPRAZolam 0.25 milliGRAM(s) Oral three times a day PRN anxiety  ondansetron Injectable 4 milliGRAM(s) IV Push every 6 hours PRN Nausea and/or Vomiting  oxyCODONE    5 mG/acetaminophen 325 mG 1 Tablet(s) Oral every 6 hours PRN Moderate Pain (4 - 6)  zolpidem 5 milliGRAM(s) Oral at bedtime PRN Insomnia        RADIOLOGY & ADDITIONAL TESTS:  < from: Xray Chest 1 View- PORTABLE-Routine (03.13.18 @ 04:40) >  IMPRESSION:  Newly developing small right apical pneumothorax.  Additional findings as above.    < end of copied text >

## 2018-03-13 NOTE — CONSULT NOTE ADULT - SUBJECTIVE AND OBJECTIVE BOX
HISTORY OF PRESENT ILLNESS:   62 year old male with severe AS and bicuspid AV s/p bioprosthetic AVR on 3/12/18. Post op pt with CHB.  He is currently AS-Vpacing via external epicardial pacing system.  He c/o sternal wound pain and SOB today while walking.  Other PMH includes hypertension, hyperlipidemia, GERD, sleep apnea (not on CPAP - didn't tolerate many years ago) and chronic back pain.     Echo 11/7/17: LVEF 40-45%. Calcified bicuspid aortic valve with reduced opening. The mean gradient is 30mmHg, moderate aortic stenosis. Peak velocity is 3.2m/s. Mild aortic regurgitation.     Cardiac cath 2/21/18 : no coronary artery disease. VILLA 0.6cm2. Mean gradient 36mmHg.     Echo 2/28/17: :LVEF 65-70%.        The patient c/o fatigue and decrease in stamina. The patient states that it is more difficult for him to walk up 2 flights of stairs compared to last year because he is feeling winded and has mild dyspnea on exertion. He also states he has periodic episodes of dizziness with positional changes and "chest fluttering". Patient denies fever, chills, fatigue, headache, blurred vision, syncope, chest pain, palpitations, shortness of breath at rest orthopnea, paroxysmal nocturnal dyspnea, nausea, vomiting, abdominal pain, back pain, BRBPR or swelling to legs.              PAST MEDICAL AND SURGICAL HISTORY:  Postnasal drip  Chronic back pain  GERD (gastroesophageal reflux disease)  Severe aortic stenosis  Bicuspid aortic valve  Sleep apnea  High cholesterol  Hypertension  H/O foot surgery  History of appendectomy  History of back surgery      FAMILY HISTORY:  Family history of cancer (Father)  Family history of early CAD (Mother)  Hypertension (Mother)      SOCIAL HISTORY:   Denies ETOH / illicit drugs  Smoke cigar.      REVIEW OF SYSTEM:   CONSTITUTIONAL: Denies fevers or chills. + Weakness   EYES/ENT: No visual changes;  No vertigo or throat pain   NECK: No pain or stiffness  RESPIRATORY: + SOB. No cough.   CARDIOVASCULAR: See HPI.  Denies syncope.  Occasional dizziness. Denies LE edema.   GASTROINTESTINAL: No abdominal or epigastric pain. No nausea, vomiting, or hematemesis; No diarrhea or constipation. No melena or hematochezia.  GENITOURINARY: No dysuria, frequency or hematuria  MUSCULOSKELETAL: no joint pain / swelling  NEUROLOGICAL: No numbness or weakness  HEME/ ONC: denies easy bleeding / bruising   PSYCH: + anxiety   SKIN: No itching, burning, rashes, or lesions   All other review of systems is negative unless indicated above.    ALLERGY:  No Known Allergies      INPATIENT MEDICATIONS:  aspirin enteric coated 81 milliGRAM(s) Oral daily  ceFAZolin  Injectable. 2000 milliGRAM(s) IV Push every 8 hours  dexmedetomidine Infusion 0.7 MICROgram(s)/kG/Hr IV Continuous <Continuous>  dexmedetomidine Infusion 0.7 MICROgram(s)/kG/Hr IV Continuous <Continuous>  dextrose 50% Injectable 50 milliLiter(s) IV Push every 15 minutes  dextrose 50% Injectable 25 milliLiter(s) IV Push every 15 minutes  famotidine Injectable 20 milliGRAM(s) IV Push daily  heparin  Injectable 5000 Unit(s) SubCutaneous every 8 hours  insulin Infusion 1 Unit(s)/Hr IV Continuous <Continuous>  meperidine     Injectable 25 milliGRAM(s) IV Push once PRN  norepinephrine Infusion 0.04 MICROgram(s)/kG/Min IV Continuous <Continuous>  norepinephrine Infusion 0.02 MICROgram(s)/kG/Min IV Continuous <Continuous>  ondansetron Injectable 4 milliGRAM(s) IV Push every 6 hours PRN  simvastatin 20 milliGRAM(s) Oral at bedtime  sodium chloride 0.45%. 1000 milliLiter(s) IV Continuous <Continuous>      VITAL SIGNS:   T(C): 36.2 (03-13-18 @ 10:41), Max: 36.8 (03-13-18 @ 05:00)  HR: 96 (03-13-18 @ 12:00) (70 - 102)  BP: 97/55 (03-12-18 @ 21:00) (97/55 - 97/55)  RR: 16 (03-13-18 @ 12:00) (7 - 712)  SpO2: 97% (03-13-18 @ 12:00) (93% - 100%)      PHYSICAL EXAM:   Appearance: anxious.   Neck: right IJ triple lumen  CVS: S1/S2 normal, regular. Sternal wound dressing I/C/D.    + Epicardial wire. 2 daniel drains  Pulm: decreased BS bilaterally. No wheezing  Abd:  +BS, Soft, NT/ND	  Ext: No LE edema. Compression stocking on  Neuro: AAOx 3. Moves all extremities.     LABS:                        8.7    7.7   )-----------( 52       ( 13 Mar 2018 02:39 )             28.2     03-13    140  |  106  |  20  ----------------------------<  143<H>  4.3   |  23  |  0.99    Ca    7.8<L>      13 Mar 2018 02:39  Mg     2.5     03-13    TPro  5.2<L>  /  Alb  3.5  /  TBili  0.9  /  DBili  x   /  AST  50<H>  /  ALT  22  /  AlkPhos  31<L>  03-12    PT/INR - ( 13 Mar 2018 02:39 )   PT: 13.5 sec;   INR: 1.21          PTT - ( 13 Mar 2018 02:39 )  PTT:24.9 sec  LIVER FUNCTIONS - ( 12 Mar 2018 21:50 )  Alb: 3.5 g/dL / Pro: 5.2 g/dL / ALK PHOS: 31 U/L / ALT: 22 U/L / AST: 50 U/L / GGT: x

## 2018-03-13 NOTE — PROVIDER CONTACT NOTE (OTHER) - SITUATION
arterial line no longer functioning dr emerson made aware.
platelet count decreased from 127 to 48.  Both [facility] Metz and Dr. Cox notifed.

## 2018-03-13 NOTE — CONSULT NOTE ADULT - ASSESSMENT
62 year old male with severe AS and bicuspid AV s/p bioprosthetic AVR on 3/12/18. Post op pt with CHB.  He is currently AS-Vpacing via external epicardial pacing system. Good pacing threshold. EF 60% on most recent echo (2/2018)   -  Plan for PPM implant either tomorrow or thursday if still remains in CHB.   - Pain control as he is anxious and is feeling SOB.  Would hope to have him lay flat with 1-2 pillows in order to tolerate PPM procedure.   - Case d/w Dr. Watson.

## 2018-03-14 LAB
ANION GAP SERPL CALC-SCNC: 11 MMOL/L — SIGNIFICANT CHANGE UP (ref 5–17)
APTT BLD: 26.6 SEC — LOW (ref 27.5–37.4)
BUN SERPL-MCNC: 19 MG/DL — SIGNIFICANT CHANGE UP (ref 7–23)
CALCIUM SERPL-MCNC: 8.3 MG/DL — LOW (ref 8.4–10.5)
CHLORIDE SERPL-SCNC: 102 MMOL/L — SIGNIFICANT CHANGE UP (ref 96–108)
CO2 SERPL-SCNC: 26 MMOL/L — SIGNIFICANT CHANGE UP (ref 22–31)
CREAT SERPL-MCNC: 1.06 MG/DL — SIGNIFICANT CHANGE UP (ref 0.5–1.3)
GLUCOSE SERPL-MCNC: 127 MG/DL — HIGH (ref 70–99)
HCT VFR BLD CALC: 28.4 % — LOW (ref 39–50)
HGB BLD-MCNC: 8.9 G/DL — LOW (ref 13–17)
INR BLD: 1.17 — HIGH (ref 0.88–1.16)
MAGNESIUM SERPL-MCNC: 2.6 MG/DL — SIGNIFICANT CHANGE UP (ref 1.6–2.6)
MCHC RBC-ENTMCNC: 27.8 PG — SIGNIFICANT CHANGE UP (ref 27–34)
MCHC RBC-ENTMCNC: 31.3 G/DL — LOW (ref 32–36)
MCV RBC AUTO: 88.8 FL — SIGNIFICANT CHANGE UP (ref 80–100)
PLATELET # BLD AUTO: 62 K/UL — LOW (ref 150–400)
POTASSIUM SERPL-MCNC: 4.3 MMOL/L — SIGNIFICANT CHANGE UP (ref 3.5–5.3)
POTASSIUM SERPL-SCNC: 4.3 MMOL/L — SIGNIFICANT CHANGE UP (ref 3.5–5.3)
PROTHROM AB SERPL-ACNC: 13 SEC — HIGH (ref 9.8–12.7)
RBC # BLD: 3.2 M/UL — LOW (ref 4.2–5.8)
RBC # FLD: 13.7 % — SIGNIFICANT CHANGE UP (ref 10.3–16.9)
SODIUM SERPL-SCNC: 139 MMOL/L — SIGNIFICANT CHANGE UP (ref 135–145)
WBC # BLD: 11.7 K/UL — HIGH (ref 3.8–10.5)
WBC # FLD AUTO: 11.7 K/UL — HIGH (ref 3.8–10.5)

## 2018-03-14 PROCEDURE — 71045 X-RAY EXAM CHEST 1 VIEW: CPT | Mod: 26

## 2018-03-14 RX ORDER — VANCOMYCIN HCL 1 G
1000 VIAL (EA) INTRAVENOUS ONCE
Qty: 0 | Refills: 0 | Status: DISCONTINUED | OUTPATIENT
Start: 2018-03-14 | End: 2018-03-14

## 2018-03-14 RX ORDER — OXYCODONE AND ACETAMINOPHEN 5; 325 MG/1; MG/1
2 TABLET ORAL EVERY 6 HOURS
Qty: 0 | Refills: 0 | Status: DISCONTINUED | OUTPATIENT
Start: 2018-03-14 | End: 2018-03-18

## 2018-03-14 RX ORDER — FUROSEMIDE 40 MG
20 TABLET ORAL ONCE
Qty: 0 | Refills: 0 | Status: COMPLETED | OUTPATIENT
Start: 2018-03-14 | End: 2018-03-14

## 2018-03-14 RX ORDER — KETOROLAC TROMETHAMINE 30 MG/ML
30 SYRINGE (ML) INJECTION EVERY 6 HOURS
Qty: 0 | Refills: 0 | Status: DISCONTINUED | OUTPATIENT
Start: 2018-03-14 | End: 2018-03-18

## 2018-03-14 RX ORDER — ACETAMINOPHEN 500 MG
1000 TABLET ORAL ONCE
Qty: 0 | Refills: 0 | Status: COMPLETED | OUTPATIENT
Start: 2018-03-14 | End: 2018-03-15

## 2018-03-14 RX ORDER — LIDOCAINE 4 G/100G
1 CREAM TOPICAL DAILY
Qty: 0 | Refills: 0 | Status: DISCONTINUED | OUTPATIENT
Start: 2018-03-14 | End: 2018-03-18

## 2018-03-14 RX ADMIN — HEPARIN SODIUM 5000 UNIT(S): 5000 INJECTION INTRAVENOUS; SUBCUTANEOUS at 21:53

## 2018-03-14 RX ADMIN — OXYCODONE AND ACETAMINOPHEN 1 TABLET(S): 5; 325 TABLET ORAL at 06:50

## 2018-03-14 RX ADMIN — HEPARIN SODIUM 5000 UNIT(S): 5000 INJECTION INTRAVENOUS; SUBCUTANEOUS at 06:16

## 2018-03-14 RX ADMIN — Medication 0.25 MILLIGRAM(S): at 21:52

## 2018-03-14 RX ADMIN — MONTELUKAST 10 MILLIGRAM(S): 4 TABLET, CHEWABLE ORAL at 12:31

## 2018-03-14 RX ADMIN — SENNA PLUS 1 TABLET(S): 8.6 TABLET ORAL at 21:52

## 2018-03-14 RX ADMIN — SIMVASTATIN 20 MILLIGRAM(S): 20 TABLET, FILM COATED ORAL at 21:52

## 2018-03-14 RX ADMIN — Medication 100 MILLIGRAM(S): at 21:52

## 2018-03-14 RX ADMIN — PANTOPRAZOLE SODIUM 40 MILLIGRAM(S): 20 TABLET, DELAYED RELEASE ORAL at 06:17

## 2018-03-14 RX ADMIN — LORATADINE 10 MILLIGRAM(S): 10 TABLET ORAL at 12:31

## 2018-03-14 RX ADMIN — LIDOCAINE 1 PATCH: 4 CREAM TOPICAL at 21:53

## 2018-03-14 RX ADMIN — Medication 0.25 MILLIGRAM(S): at 12:35

## 2018-03-14 RX ADMIN — OXYCODONE AND ACETAMINOPHEN 1 TABLET(S): 5; 325 TABLET ORAL at 07:45

## 2018-03-14 RX ADMIN — Medication 81 MILLIGRAM(S): at 12:30

## 2018-03-14 RX ADMIN — Medication 20 MILLIGRAM(S): at 09:47

## 2018-03-14 RX ADMIN — Medication 0.25 MILLIGRAM(S): at 06:17

## 2018-03-14 RX ADMIN — LIDOCAINE 1 PATCH: 4 CREAM TOPICAL at 10:57

## 2018-03-14 RX ADMIN — Medication 100 MILLIGRAM(S): at 06:17

## 2018-03-14 NOTE — PROGRESS NOTE ADULT - SUBJECTIVE AND OBJECTIVE BOX
EPS Progress Note    S: Pt was doing well this morning; able to lie flat for 10 minutes without SOB. He was taken to EP lab for PPM but noted to be febrile when VS was taken in the EPS lab (Temp 101F).  Pt is taken back to his room. Case is cancelled.     O: T(C): 37.4 (03-14-18 @ 14:00), Max: 37.5 (03-13-18 @ 21:39)  HR: 70 (03-14-18 @ 14:01) (62 - 102)  BP: 139/67 (03-14-18 @ 14:01) (106/57 - 157/67)  RR: 16 (03-14-18 @ 14:01) (14 - 32)  SpO2: 97% (03-14-18 @ 14:01) (97% - 100%)      TELE: Heart block with escape rhythm (wide complex-LBBB) at 60 bpm.   Epicardial wire pacing system set at VVI 45 bpm.     PHYSICAL  General:  NAD     Neck: +R IJ triple lumen      Chest:  Slight diminished BS at bases. No rales / wheeze  Cardiac:   + s1/s2, RRR, sternal wound dressing I/C/D.  + 2 mediastinal drains and Epicardial pacing wires   Abdomen:  +BS , soft ND/NT  Extremities: No LE edema  Neuro: AAO x 3    LABS:                        8.9    11.7  )-----------( 62       ( 14 Mar 2018 05:46 )             28.4     03-14    139  |  102  |  19  ----------------------------<  127<H>  4.3   |  26  |  1.06    Ca    8.3<L>      14 Mar 2018 05:46  Phos  1.9     03-13  Mg     2.6     03-14    TPro  6.1  /  Alb  4.0  /  TBili  0.5  /  DBili  x   /  AST  40  /  ALT  18  /  AlkPhos  33<L>  03-13    PT/INR - ( 14 Mar 2018 05:46 )   PT: 13.0 sec;   INR: 1.17          PTT - ( 14 Mar 2018 05:46 )  PTT:26.6 sec    MEDICATIONS:  ALPRAZolam 0.25 milliGRAM(s) Oral three times a day PRN  aspirin enteric coated 81 milliGRAM(s) Oral daily  docusate sodium 100 milliGRAM(s) Oral three times a day  heparin  Injectable 5000 Unit(s) SubCutaneous every 8 hours  lidocaine   Patch 1 Patch Transdermal daily  loratadine 10 milliGRAM(s) Oral daily  montelukast 10 milliGRAM(s) Oral daily  ondansetron Injectable 4 milliGRAM(s) IV Push every 6 hours PRN  oxyCODONE    5 mG/acetaminophen 325 mG 1 Tablet(s) Oral every 6 hours PRN  oxyCODONE    5 mG/acetaminophen 325 mG 2 Tablet(s) Oral every 6 hours PRN  pantoprazole    Tablet 40 milliGRAM(s) Oral before breakfast  senna 1 Tablet(s) Oral at bedtime  simvastatin 20 milliGRAM(s) Oral at bedtime  sodium chloride 0.45%. 1000 milliLiter(s) IV Continuous <Continuous>  zolpidem 5 milliGRAM(s) Oral at bedtime PRN

## 2018-03-14 NOTE — PROGRESS NOTE ADULT - SUBJECTIVE AND OBJECTIVE BOX
Patient discussed on morning rounds with Dr. Blandon      Operation / Date: 3/12/18 mini-AVR    SUBJECTIVE ASSESSMENT:    Pt reports signif incisional pain limiting his inspiratory effort.  He denies dyspnea/SOB at rest but does get SOB when ambulating.  He denies subjective fevers/chills.    Vital Signs Last 24 Hrs  T(C): 37.8 (14 Mar 2018 16:53), Max: 37.8 (14 Mar 2018 16:53)  T(F): 100 (14 Mar 2018 16:53), Max: 100 (14 Mar 2018 16:53)  HR: 76 (14 Mar 2018 18:00) (62 - 102)  BP: 114/61 (14 Mar 2018 18:00) (106/57 - 157/67)  BP(mean): 85 (14 Mar 2018 18:00) (71 - 100)  RR: 16 (14 Mar 2018 18:00) (14 - 32)  SpO2: 97% (14 Mar 2018 18:00) (97% - 100%)  I&O's Detail    13 Mar 2018 07:01  -  14 Mar 2018 07:00  --------------------------------------------------------  IN:    dexmedetomidine Infusion: 5.4 mL    IV PiggyBack: 200 mL    Oral Fluid: 700 mL    sodium chloride 0.45%.: 130 mL  Total IN: 1035.4 mL    OUT:    Bulb: 145 mL    Indwelling Catheter - Urethral: 1965 mL  Total OUT: 2110 mL    Total NET: -1074.6 mL      14 Mar 2018 07:01  -  14 Mar 2018 18:57  --------------------------------------------------------  IN:    Oral Fluid: 200 mL  Total IN: 200 mL    OUT:    Bulb: 40 mL    Indwelling Catheter - Urethral: 765 mL  Total OUT: 805 mL    Total NET: -605 mL          CHEST TUBE:  No.  ELYSSA DRAIN:  Yes.  EPICARDIAL WIRES: No.  TIE DOWNS: No.  JAMIL: No.    PHYSICAL EXAM:    General: NAd     Neurological: A&Ox3    Cardiovascular: S1S2 RRR    Respiratory: bibasilar crackles L>R    Gastrointestinal: soft NT/ND +BS    Extremities: warm, no edema    Vascular: warm and well perfused bilaterally    Incision Sites: MSI C/D/I    LABS:                        8.9    11.7  )-----------( 62       ( 14 Mar 2018 05:46 )             28.4       COUMADIN:  Yes/No. REASON: .    PT/INR - ( 14 Mar 2018 05:46 )   PT: 13.0 sec;   INR: 1.17          PTT - ( 14 Mar 2018 05:46 )  PTT:26.6 sec    03-14    139  |  102  |  19  ----------------------------<  127<H>  4.3   |  26  |  1.06    Ca    8.3<L>      14 Mar 2018 05:46  Phos  1.9     03-13  Mg     2.6     03-14    TPro  6.1  /  Alb  4.0  /  TBili  0.5  /  DBili  x   /  AST  40  /  ALT  18  /  AlkPhos  33<L>  03-13          MEDICATIONS  (STANDING):  acetaminophen  IVPB. 1000 milliGRAM(s) IV Intermittent once  aspirin enteric coated 81 milliGRAM(s) Oral daily  docusate sodium 100 milliGRAM(s) Oral three times a day  heparin  Injectable 5000 Unit(s) SubCutaneous every 8 hours  lidocaine   Patch 1 Patch Transdermal daily  loratadine 10 milliGRAM(s) Oral daily  montelukast 10 milliGRAM(s) Oral daily  pantoprazole    Tablet 40 milliGRAM(s) Oral before breakfast  senna 1 Tablet(s) Oral at bedtime  simvastatin 20 milliGRAM(s) Oral at bedtime  sodium chloride 0.45%. 1000 milliLiter(s) (10 mL/Hr) IV Continuous <Continuous>    MEDICATIONS  (PRN):  ALPRAZolam 0.25 milliGRAM(s) Oral three times a day PRN anxiety  ketorolac   Injectable 30 milliGRAM(s) IV Push every 6 hours PRN breakthrough pain  ondansetron Injectable 4 milliGRAM(s) IV Push every 6 hours PRN Nausea and/or Vomiting  oxyCODONE    5 mG/acetaminophen 325 mG 1 Tablet(s) Oral every 6 hours PRN Moderate Pain (4 - 6)  oxyCODONE    5 mG/acetaminophen 325 mG 2 Tablet(s) Oral every 6 hours PRN Severe Pain (7 - 10)  zolpidem 5 milliGRAM(s) Oral at bedtime PRN Insomnia        RADIOLOGY & ADDITIONAL TESTS:  CXR: bibasilar opacities  Ultrasound: no pleural effusions

## 2018-03-14 NOTE — PROGRESS NOTE ADULT - ASSESSMENT
63 y/o M with severe AS / bicuspid AV now s/p bioprosthetic AVR on 3/12/18 with post op CHB.  He has escape rhythm at 60 bpm but still with AV dissociation.  PPM procedure is canceled this afternoon due to fever of up to 101F.   - Please w/u for fever.    - PPM postponed until pt is afebrile for at least 24 hours.    - ID consult if possible to clear for ppm.

## 2018-03-14 NOTE — PROGRESS NOTE ADULT - ASSESSMENT
62 year old M PMHx HTN, HLD, GERD, sleep apnea (not on CPAP), chronic back pain, followed by Dr. Blandon as outpatient for bicuspid aortic valve, aortic stenosis and ascending aortic aneurysm. On 3/12/18 patient underwent Mini AVR, EF 40-45%, intra op given pump blood.  Arrived to CT ICU intubated on norepi, which was eventually weaned and extubated. POD1 A sensed, V paced at 80 with no underlying rhythm, then CHB, now junctional with escape rhythm in 70's    Neurovascular: No delirium. Pain well controlled with current regimen.  -Continue percocet PRN pain, xanax PRN anxiety, zofran PRN nausea, Ambien PRN insomnia    Cardiovascular: Hemodynamically stable. HR controlled.  - Hx bicuspid valve, severe AS, s/p AVR EF 40%  - continue ASA 81mg daily, lipitor 20mg QHS  -PPM cancelled today 2/2 fever 102 in EP lab  -monitor HR/BP/tele    Respiratory: 02 Sat = 96% on 4LNC. Hx TYLER  -wean to RA from for O2 Sat > 93%.  -Encourage C+DB and Use of IS 10x / hr while awake.  -Hx TYLER- BiPAP tonight    GI: Stable.  -NPO after MN for PPM tomorrow  -continue protonix for GI PPX.  -Bowel regimen   -PO Diet.    Renal / : BUN/Cr 19/1.06  -Monitor renal function.  -Monitor I/O's.  -mark removed, f/u TOV    Endocrine:    -A1c 5.4  -TSH WNL    Hematologic: H&H 9/29  -f/u AM CBC.    ID: Tmax 102, WBC 11.7 trending down  -pan cultured  -Observe for SIRS/Sepsis Syndrome.    Prophylaxis:  -DVT prophylaxis with 5000 SubQ Heparin q8h.  -SCD's    Disposition:  -Mini ICU

## 2018-03-15 LAB
ANION GAP SERPL CALC-SCNC: 10 MMOL/L — SIGNIFICANT CHANGE UP (ref 5–17)
ANION GAP SERPL CALC-SCNC: 11 MMOL/L — SIGNIFICANT CHANGE UP (ref 5–17)
APPEARANCE UR: CLEAR — SIGNIFICANT CHANGE UP
BILIRUB UR-MCNC: (no result)
BUN SERPL-MCNC: 17 MG/DL — SIGNIFICANT CHANGE UP (ref 7–23)
BUN SERPL-MCNC: 20 MG/DL — SIGNIFICANT CHANGE UP (ref 7–23)
CALCIUM SERPL-MCNC: 8.4 MG/DL — SIGNIFICANT CHANGE UP (ref 8.4–10.5)
CALCIUM SERPL-MCNC: 8.8 MG/DL — SIGNIFICANT CHANGE UP (ref 8.4–10.5)
CHLORIDE SERPL-SCNC: 98 MMOL/L — SIGNIFICANT CHANGE UP (ref 96–108)
CHLORIDE SERPL-SCNC: 99 MMOL/L — SIGNIFICANT CHANGE UP (ref 96–108)
CO2 SERPL-SCNC: 27 MMOL/L — SIGNIFICANT CHANGE UP (ref 22–31)
CO2 SERPL-SCNC: 27 MMOL/L — SIGNIFICANT CHANGE UP (ref 22–31)
COLOR SPEC: YELLOW — SIGNIFICANT CHANGE UP
CREAT SERPL-MCNC: 1.03 MG/DL — SIGNIFICANT CHANGE UP (ref 0.5–1.3)
CREAT SERPL-MCNC: 1.14 MG/DL — SIGNIFICANT CHANGE UP (ref 0.5–1.3)
DIFF PNL FLD: (no result)
GLUCOSE SERPL-MCNC: 108 MG/DL — HIGH (ref 70–99)
GLUCOSE SERPL-MCNC: 109 MG/DL — HIGH (ref 70–99)
GLUCOSE UR QL: NEGATIVE — SIGNIFICANT CHANGE UP
HCT VFR BLD CALC: 26.8 % — LOW (ref 39–50)
HCT VFR BLD CALC: 28.5 % — LOW (ref 39–50)
HGB BLD-MCNC: 8.2 G/DL — LOW (ref 13–17)
HGB BLD-MCNC: 9.1 G/DL — LOW (ref 13–17)
KETONES UR-MCNC: 15 MG/DL
LEUKOCYTE ESTERASE UR-ACNC: (no result)
MAGNESIUM SERPL-MCNC: 2.5 MG/DL — SIGNIFICANT CHANGE UP (ref 1.6–2.6)
MAGNESIUM SERPL-MCNC: 2.5 MG/DL — SIGNIFICANT CHANGE UP (ref 1.6–2.6)
MCHC RBC-ENTMCNC: 27.3 PG — SIGNIFICANT CHANGE UP (ref 27–34)
MCHC RBC-ENTMCNC: 28.5 PG — SIGNIFICANT CHANGE UP (ref 27–34)
MCHC RBC-ENTMCNC: 30.6 G/DL — LOW (ref 32–36)
MCHC RBC-ENTMCNC: 31.9 G/DL — LOW (ref 32–36)
MCV RBC AUTO: 89.3 FL — SIGNIFICANT CHANGE UP (ref 80–100)
MCV RBC AUTO: 89.3 FL — SIGNIFICANT CHANGE UP (ref 80–100)
NITRITE UR-MCNC: NEGATIVE — SIGNIFICANT CHANGE UP
PH UR: 6 — SIGNIFICANT CHANGE UP (ref 5–8)
PHOSPHATE SERPL-MCNC: 1.9 MG/DL — LOW (ref 2.5–4.5)
PLATELET # BLD AUTO: 52 K/UL — LOW (ref 150–400)
PLATELET # BLD AUTO: 67 K/UL — LOW (ref 150–400)
POTASSIUM SERPL-MCNC: 3.8 MMOL/L — SIGNIFICANT CHANGE UP (ref 3.5–5.3)
POTASSIUM SERPL-MCNC: 4.4 MMOL/L — SIGNIFICANT CHANGE UP (ref 3.5–5.3)
POTASSIUM SERPL-SCNC: 3.8 MMOL/L — SIGNIFICANT CHANGE UP (ref 3.5–5.3)
POTASSIUM SERPL-SCNC: 4.4 MMOL/L — SIGNIFICANT CHANGE UP (ref 3.5–5.3)
PROT UR-MCNC: 30 MG/DL
RBC # BLD: 3 M/UL — LOW (ref 4.2–5.8)
RBC # BLD: 3.19 M/UL — LOW (ref 4.2–5.8)
RBC # FLD: 13.6 % — SIGNIFICANT CHANGE UP (ref 10.3–16.9)
RBC # FLD: 13.6 % — SIGNIFICANT CHANGE UP (ref 10.3–16.9)
SODIUM SERPL-SCNC: 135 MMOL/L — SIGNIFICANT CHANGE UP (ref 135–145)
SODIUM SERPL-SCNC: 137 MMOL/L — SIGNIFICANT CHANGE UP (ref 135–145)
SP GR SPEC: 1.02 — SIGNIFICANT CHANGE UP (ref 1–1.03)
UROBILINOGEN FLD QL: 0.2 E.U./DL — SIGNIFICANT CHANGE UP
WBC # BLD: 11.8 K/UL — HIGH (ref 3.8–10.5)
WBC # BLD: 9.4 K/UL — SIGNIFICANT CHANGE UP (ref 3.8–10.5)
WBC # FLD AUTO: 11.8 K/UL — HIGH (ref 3.8–10.5)
WBC # FLD AUTO: 9.4 K/UL — SIGNIFICANT CHANGE UP (ref 3.8–10.5)

## 2018-03-15 PROCEDURE — 71045 X-RAY EXAM CHEST 1 VIEW: CPT | Mod: 26

## 2018-03-15 PROCEDURE — 93306 TTE W/DOPPLER COMPLETE: CPT | Mod: 26

## 2018-03-15 RX ORDER — POTASSIUM CHLORIDE 20 MEQ
20 PACKET (EA) ORAL ONCE
Qty: 0 | Refills: 0 | Status: COMPLETED | OUTPATIENT
Start: 2018-03-15 | End: 2018-03-15

## 2018-03-15 RX ORDER — SODIUM,POTASSIUM PHOSPHATES 278-250MG
1 POWDER IN PACKET (EA) ORAL
Qty: 0 | Refills: 0 | Status: DISCONTINUED | OUTPATIENT
Start: 2018-03-15 | End: 2018-03-15

## 2018-03-15 RX ORDER — VANCOMYCIN HCL 1 G
1000 VIAL (EA) INTRAVENOUS ONCE
Qty: 0 | Refills: 0 | Status: COMPLETED | OUTPATIENT
Start: 2018-03-16 | End: 2018-03-16

## 2018-03-15 RX ORDER — VANCOMYCIN HCL 1 G
1000 VIAL (EA) INTRAVENOUS ONCE
Qty: 0 | Refills: 0 | Status: COMPLETED | OUTPATIENT
Start: 2018-03-15 | End: 2018-03-15

## 2018-03-15 RX ORDER — MAGNESIUM HYDROXIDE 400 MG/1
30 TABLET, CHEWABLE ORAL DAILY
Qty: 0 | Refills: 0 | Status: DISCONTINUED | OUTPATIENT
Start: 2018-03-15 | End: 2018-03-18

## 2018-03-15 RX ORDER — SODIUM,POTASSIUM PHOSPHATES 278-250MG
1 POWDER IN PACKET (EA) ORAL ONCE
Qty: 0 | Refills: 0 | Status: COMPLETED | OUTPATIENT
Start: 2018-03-15 | End: 2018-03-15

## 2018-03-15 RX ADMIN — Medication 100 MILLIGRAM(S): at 07:10

## 2018-03-15 RX ADMIN — Medication 30 MILLIGRAM(S): at 22:00

## 2018-03-15 RX ADMIN — OXYCODONE AND ACETAMINOPHEN 2 TABLET(S): 5; 325 TABLET ORAL at 11:50

## 2018-03-15 RX ADMIN — Medication 30 MILLIGRAM(S): at 07:09

## 2018-03-15 RX ADMIN — Medication 20 MILLIEQUIVALENT(S): at 08:09

## 2018-03-15 RX ADMIN — Medication 100 MILLIGRAM(S): at 16:20

## 2018-03-15 RX ADMIN — LORATADINE 10 MILLIGRAM(S): 10 TABLET ORAL at 16:21

## 2018-03-15 RX ADMIN — Medication 100 MILLIGRAM(S): at 21:43

## 2018-03-15 RX ADMIN — Medication 1 TABLET(S): at 10:50

## 2018-03-15 RX ADMIN — HEPARIN SODIUM 5000 UNIT(S): 5000 INJECTION INTRAVENOUS; SUBCUTANEOUS at 21:43

## 2018-03-15 RX ADMIN — Medication 30 MILLIGRAM(S): at 21:43

## 2018-03-15 RX ADMIN — MONTELUKAST 10 MILLIGRAM(S): 4 TABLET, CHEWABLE ORAL at 16:20

## 2018-03-15 RX ADMIN — SENNA PLUS 1 TABLET(S): 8.6 TABLET ORAL at 21:45

## 2018-03-15 RX ADMIN — Medication 400 MILLIGRAM(S): at 18:45

## 2018-03-15 RX ADMIN — HEPARIN SODIUM 5000 UNIT(S): 5000 INJECTION INTRAVENOUS; SUBCUTANEOUS at 06:00

## 2018-03-15 RX ADMIN — OXYCODONE AND ACETAMINOPHEN 2 TABLET(S): 5; 325 TABLET ORAL at 12:37

## 2018-03-15 RX ADMIN — Medication 81 MILLIGRAM(S): at 16:20

## 2018-03-15 RX ADMIN — LIDOCAINE 1 PATCH: 4 CREAM TOPICAL at 16:21

## 2018-03-15 RX ADMIN — PANTOPRAZOLE SODIUM 40 MILLIGRAM(S): 20 TABLET, DELAYED RELEASE ORAL at 07:10

## 2018-03-15 RX ADMIN — Medication 0.25 MILLIGRAM(S): at 21:44

## 2018-03-15 RX ADMIN — Medication 1000 MILLIGRAM(S): at 19:00

## 2018-03-15 RX ADMIN — Medication 30 MILLIGRAM(S): at 06:34

## 2018-03-15 RX ADMIN — Medication 250 MILLIGRAM(S): at 15:51

## 2018-03-15 RX ADMIN — SIMVASTATIN 20 MILLIGRAM(S): 20 TABLET, FILM COATED ORAL at 21:43

## 2018-03-15 NOTE — PROGRESS NOTE ADULT - ASSESSMENT
62 year old M PMHx HTN, HLD, GERD, sleep apnea (not on CPAP), chronic back pain, followed by Dr. Blandon as outpatient for bicuspid aortic valve, aortic stenosis and ascending aortic aneurysm. On 3/12/18 patient underwent Mini AVR, EF 40-45%, intra op given pump blood.  Arrived to CT ICU intubated on norepi, which was eventually weaned and extubated. POD1 A sensed, V paced at 80 with no underlying rhythm, then CHB, now junctional with escape rhythm in 70's. EP following, patient went for PPM yesterday but was found to be febrile, therefore postponed, patient pan cx.    Neurovascular: No delirium. Pain well controlled with current regimen.  -Continue percocet PRN pain, xanax PRN anxiety, zofran PRN nausea, Ambien PRN insomnia    Cardiovascular: Hemodynamically stable. HR controlled.  - Hx bicuspid valve, severe AS, s/p AVR EF 40%, CHB, will get PPM. on VVI backup.  - continue ASA 81mg daily, lipitor 20mg QHS  -PPM cancelled today 2/2 fever 102 in EP lab, afebrile since, blood cx negative.  -f/u post op echo today.  -monitor HR/BP/tele    Respiratory: 02 Sat = 97% on 3LNC. Hx TYLER  -wean to RA from for O2 Sat > 93%.  -Encourage C+DB and Use of IS 10x / hr while awake.  -Hx TYLER- BiPAP   -Bedside u/s yesterday revealed no pleural effusion, b/l atelectasis.    GI: Stable.  -continue protonix for GI PPX.  -Bowel regimen   -PO Diet.    Renal / : BUN/Cr 17/1.03  -Monitor renal function.  -Monitor I/O's.  -Lasix 20mg IV x 1 this Am.    Endocrine:    -A1c 5.4  -TSH WNL    Hematologic: H&H 8/26 today, f/u echo to r/o pericardial effusion.  -f/u AM CBC.    ID: Afebrile OVN, WBC 9.4  -Blood cx negative, f/u U/A  -Observe for SIRS/Sepsis Syndrome.    Prophylaxis:  -DVT prophylaxis with 5000 SubQ Heparin q8h.  -SCD's    Disposition:  -Mini ICU

## 2018-03-15 NOTE — DIETITIAN INITIAL EVALUATION ADULT. - OTHER INFO
Pt is s/p mini AVR 3/12 s/p extubation.  Pt is planned for PPM tomorrow; previously postponed 2/2 febrile.  Pt endorses good appetite at this time; consuming ~75% of meals.  Pt with constipation - no further GI distress.  Pain is being managed.  Skin: intact.

## 2018-03-15 NOTE — PROGRESS NOTE ADULT - SUBJECTIVE AND OBJECTIVE BOX
Patient discussed on morning rounds with Dr. Moe    Operation / Date:  3/12/18 mini AVR EF 40%    SUBJECTIVE ASSESSMENT:  62y Male seen and examined. patient feel well, no acute complaints. Denies fever, chest pain, palpitations, worsening SOB, abdominal pain, n/v.  IS ambulating, using IS, pulling 1000cc, tolerating PO diet, no BM since surgery.        Vital Signs Last 24 Hrs  T(C): 36.2 (15 Mar 2018 09:05), Max: 37.8 (14 Mar 2018 16:53)  T(F): 97.2 (15 Mar 2018 09:05), Max: 100 (14 Mar 2018 16:53)  HR: 73 (15 Mar 2018 08:52) (66 - 76)  BP: 103/65 (15 Mar 2018 08:00) (103/65 - 143/76)  BP(mean): 82 (15 Mar 2018 08:00) (69 - 100)  RR: 18 (15 Mar 2018 08:52) (15 - 20)  SpO2: 97% (15 Mar 2018 08:52) (96% - 100%)  I&O's Detail    14 Mar 2018 07:01  -  15 Mar 2018 07:00  --------------------------------------------------------  IN:    Oral Fluid: 450 mL  Total IN: 450 mL    OUT:    Bulb: 90 mL    Indwelling Catheter - Urethral: 765 mL    Voided: 800 mL  Total OUT: 1655 mL    Total NET: -1205 mL          CHEST TUBE:  No  JOSHUA DRAIN:  Yes  EPICARDIAL WIRES: No  TIE DOWNS: No.  JAMIL: No.    PHYSICAL EXAM:    General: Patient lying comfortably in bed, no acute distress     Neurological: Alert and oriented. No focal neurological deficits     Cardiovascular: S1S2, irregular rhythm, no murmurs appreciated on exam     Respiratory: Decreased L base, no wheeze/rhonchi/rales    Gastrointestinal: Abdomen soft, non tender, non distended     Extremities: Warm and well perfused. Trace b/l LE edema, No calf tenderness     Vascular: 2+ Peripheral pulses     Incision Sites: MSI: CDI, no signs of infection/dehiscence or click. Joshua sites; CDI.     LABS:                        8.2    9.4   )-----------( 52       ( 15 Mar 2018 04:05 )             26.8       COUMADIN:  No    PT/INR - ( 14 Mar 2018 05:46 )   PT: 13.0 sec;   INR: 1.17          PTT - ( 14 Mar 2018 05:46 )  PTT:26.6 sec    03-15    135  |  98  |  17  ----------------------------<  109<H>  3.8   |  27  |  1.03    Ca    8.4      15 Mar 2018 04:05  Phos  1.9     03-15  Mg     2.5     03-15    TPro  6.1  /  Alb  4.0  /  TBili  0.5  /  DBili  x   /  AST  40  /  ALT  18  /  AlkPhos  33<L>  03-13          MEDICATIONS  (STANDING):  acetaminophen  IVPB. 1000 milliGRAM(s) IV Intermittent once  aspirin enteric coated 81 milliGRAM(s) Oral daily  docusate sodium 100 milliGRAM(s) Oral three times a day  heparin  Injectable 5000 Unit(s) SubCutaneous every 8 hours  lidocaine   Patch 1 Patch Transdermal daily  loratadine 10 milliGRAM(s) Oral daily  montelukast 10 milliGRAM(s) Oral daily  pantoprazole    Tablet 40 milliGRAM(s) Oral before breakfast  senna 1 Tablet(s) Oral at bedtime  simvastatin 20 milliGRAM(s) Oral at bedtime  sodium chloride 0.45%. 1000 milliLiter(s) (10 mL/Hr) IV Continuous <Continuous>    MEDICATIONS  (PRN):  ALPRAZolam 0.25 milliGRAM(s) Oral three times a day PRN anxiety  ketorolac   Injectable 30 milliGRAM(s) IV Push every 6 hours PRN breakthrough pain  ondansetron Injectable 4 milliGRAM(s) IV Push every 6 hours PRN Nausea and/or Vomiting  oxyCODONE    5 mG/acetaminophen 325 mG 1 Tablet(s) Oral every 6 hours PRN Moderate Pain (4 - 6)  oxyCODONE    5 mG/acetaminophen 325 mG 2 Tablet(s) Oral every 6 hours PRN Severe Pain (7 - 10)  zolpidem 5 milliGRAM(s) Oral at bedtime PRN Insomnia        RADIOLOGY & ADDITIONAL TESTS:  CXR: b/l pleural effusion vs atelectasis

## 2018-03-16 LAB
ANION GAP SERPL CALC-SCNC: 9 MMOL/L — SIGNIFICANT CHANGE UP (ref 5–17)
BUN SERPL-MCNC: 25 MG/DL — HIGH (ref 7–23)
CALCIUM SERPL-MCNC: 8.5 MG/DL — SIGNIFICANT CHANGE UP (ref 8.4–10.5)
CHLORIDE SERPL-SCNC: 100 MMOL/L — SIGNIFICANT CHANGE UP (ref 96–108)
CO2 SERPL-SCNC: 28 MMOL/L — SIGNIFICANT CHANGE UP (ref 22–31)
CREAT SERPL-MCNC: 1.14 MG/DL — SIGNIFICANT CHANGE UP (ref 0.5–1.3)
CULTURE RESULTS: NO GROWTH — SIGNIFICANT CHANGE UP
CULTURE RESULTS: SIGNIFICANT CHANGE UP
GLUCOSE BLDC GLUCOMTR-MCNC: 107 MG/DL — HIGH (ref 70–99)
GLUCOSE BLDC GLUCOMTR-MCNC: 83 MG/DL — SIGNIFICANT CHANGE UP (ref 70–99)
GLUCOSE SERPL-MCNC: 104 MG/DL — HIGH (ref 70–99)
GRAM STN FLD: SIGNIFICANT CHANGE UP
HCT VFR BLD CALC: 26.7 % — LOW (ref 39–50)
HGB BLD-MCNC: 8.1 G/DL — LOW (ref 13–17)
MAGNESIUM SERPL-MCNC: 2.5 MG/DL — SIGNIFICANT CHANGE UP (ref 1.6–2.6)
MCHC RBC-ENTMCNC: 27.2 PG — SIGNIFICANT CHANGE UP (ref 27–34)
MCHC RBC-ENTMCNC: 30.3 G/DL — LOW (ref 32–36)
MCV RBC AUTO: 89.6 FL — SIGNIFICANT CHANGE UP (ref 80–100)
PLATELET # BLD AUTO: 55 K/UL — LOW (ref 150–400)
POTASSIUM SERPL-MCNC: 4.2 MMOL/L — SIGNIFICANT CHANGE UP (ref 3.5–5.3)
POTASSIUM SERPL-SCNC: 4.2 MMOL/L — SIGNIFICANT CHANGE UP (ref 3.5–5.3)
RBC # BLD: 2.98 M/UL — LOW (ref 4.2–5.8)
RBC # FLD: 13.1 % — SIGNIFICANT CHANGE UP (ref 10.3–16.9)
SODIUM SERPL-SCNC: 137 MMOL/L — SIGNIFICANT CHANGE UP (ref 135–145)
SPECIMEN SOURCE: SIGNIFICANT CHANGE UP
SPECIMEN SOURCE: SIGNIFICANT CHANGE UP
WBC # BLD: 7.6 K/UL — SIGNIFICANT CHANGE UP (ref 3.8–10.5)
WBC # FLD AUTO: 7.6 K/UL — SIGNIFICANT CHANGE UP (ref 3.8–10.5)

## 2018-03-16 PROCEDURE — 71045 X-RAY EXAM CHEST 1 VIEW: CPT | Mod: 26

## 2018-03-16 PROCEDURE — 71045 X-RAY EXAM CHEST 1 VIEW: CPT | Mod: 26,77

## 2018-03-16 RX ADMIN — Medication 250 MILLIGRAM(S): at 00:20

## 2018-03-16 RX ADMIN — LIDOCAINE 1 PATCH: 4 CREAM TOPICAL at 22:46

## 2018-03-16 RX ADMIN — OXYCODONE AND ACETAMINOPHEN 1 TABLET(S): 5; 325 TABLET ORAL at 14:03

## 2018-03-16 RX ADMIN — Medication 100 MILLIGRAM(S): at 05:07

## 2018-03-16 RX ADMIN — PANTOPRAZOLE SODIUM 40 MILLIGRAM(S): 20 TABLET, DELAYED RELEASE ORAL at 05:07

## 2018-03-16 RX ADMIN — OXYCODONE AND ACETAMINOPHEN 1 TABLET(S): 5; 325 TABLET ORAL at 20:18

## 2018-03-16 RX ADMIN — LIDOCAINE 1 PATCH: 4 CREAM TOPICAL at 05:05

## 2018-03-16 RX ADMIN — MONTELUKAST 10 MILLIGRAM(S): 4 TABLET, CHEWABLE ORAL at 10:43

## 2018-03-16 RX ADMIN — Medication 30 MILLIGRAM(S): at 09:05

## 2018-03-16 RX ADMIN — HEPARIN SODIUM 5000 UNIT(S): 5000 INJECTION INTRAVENOUS; SUBCUTANEOUS at 22:40

## 2018-03-16 RX ADMIN — LORATADINE 10 MILLIGRAM(S): 10 TABLET ORAL at 10:43

## 2018-03-16 RX ADMIN — SIMVASTATIN 20 MILLIGRAM(S): 20 TABLET, FILM COATED ORAL at 22:41

## 2018-03-16 RX ADMIN — HEPARIN SODIUM 5000 UNIT(S): 5000 INJECTION INTRAVENOUS; SUBCUTANEOUS at 14:01

## 2018-03-16 RX ADMIN — HEPARIN SODIUM 5000 UNIT(S): 5000 INJECTION INTRAVENOUS; SUBCUTANEOUS at 05:07

## 2018-03-16 RX ADMIN — Medication 30 MILLIGRAM(S): at 08:15

## 2018-03-16 RX ADMIN — Medication 100 MILLIGRAM(S): at 14:00

## 2018-03-16 RX ADMIN — Medication 81 MILLIGRAM(S): at 10:43

## 2018-03-16 RX ADMIN — OXYCODONE AND ACETAMINOPHEN 1 TABLET(S): 5; 325 TABLET ORAL at 14:52

## 2018-03-16 RX ADMIN — ZOLPIDEM TARTRATE 5 MILLIGRAM(S): 10 TABLET ORAL at 22:41

## 2018-03-16 RX ADMIN — LIDOCAINE 1 PATCH: 4 CREAM TOPICAL at 10:43

## 2018-03-16 RX ADMIN — MAGNESIUM HYDROXIDE 30 MILLILITER(S): 400 TABLET, CHEWABLE ORAL at 06:51

## 2018-03-16 NOTE — PROGRESS NOTE ADULT - SUBJECTIVE AND OBJECTIVE BOX
EPS Progress Note    S: tolerates procedure (PPM implant) from yesterday. No fever / chills today.  Tolerates pain at ppm site.     O: T(C): 35.9 (03-16-18 @ 09:18), Max: 36.7 (03-16-18 @ 05:00)  HR: 69 (03-16-18 @ 10:00) (68 - 82)  BP: 105/64 (03-16-18 @ 09:45) (85/48 - 121/69)  RR: 15 (03-16-18 @ 09:45) (15 - 24)  SpO2: 97% (03-16-18 @ 10:00) (92% - 100%)    TELE:  at 70 bpm.     PHYSICAL  General:  NAD        Chest:  decreased BS. No wheezing. mild rales.   Cardiac:   + s1/s2, regular, paced rhythm.  Left sided PPM in situ. Wound site with dermabond; intact; no hematoma.   Abdomen:  +BS , soft ND/NT  Extremities: Mild LE edema    LABS:                        8.1    7.6   )-----------( 55       ( 16 Mar 2018 06:54 )             26.7     03-16    137  |  100  |  25<H>  ----------------------------<  104<H>  4.2   |  28  |  1.14    Ca    8.5      16 Mar 2018 06:54  Phos  1.9     03-15  Mg     2.5     03-16          MEDICATIONS:  ALPRAZolam 0.25 milliGRAM(s) Oral three times a day PRN  aspirin enteric coated 81 milliGRAM(s) Oral daily  docusate sodium 100 milliGRAM(s) Oral three times a day  heparin  Injectable 5000 Unit(s) SubCutaneous every 8 hours  ketorolac   Injectable 30 milliGRAM(s) IV Push every 6 hours PRN  lidocaine   Patch 1 Patch Transdermal daily  loratadine 10 milliGRAM(s) Oral daily  magnesium hydroxide Suspension 30 milliLiter(s) Oral daily PRN  montelukast 10 milliGRAM(s) Oral daily  ondansetron Injectable 4 milliGRAM(s) IV Push every 6 hours PRN  oxyCODONE    5 mG/acetaminophen 325 mG 1 Tablet(s) Oral every 6 hours PRN  oxyCODONE    5 mG/acetaminophen 325 mG 2 Tablet(s) Oral every 6 hours PRN  pantoprazole    Tablet 40 milliGRAM(s) Oral before breakfast  senna 1 Tablet(s) Oral at bedtime  simvastatin 20 milliGRAM(s) Oral at bedtime  sodium chloride 0.45%. 1000 milliLiter(s) IV Continuous <Continuous>  zolpidem 5 milliGRAM(s) Oral at bedtime PRN

## 2018-03-16 NOTE — CHART NOTE - NSCHARTNOTEFT_GEN_A_CORE
Patient with pacing wires seen at the bedside. Pacing wires x 2 pulled without difficulty, patient tolerated the procedure well. Blood pressure to be taken every 15 minutes for 1 hour. Patient remained HD stable.

## 2018-03-16 NOTE — PROGRESS NOTE ADULT - SUBJECTIVE AND OBJECTIVE BOX
Patient discussed on morning rounds with Dr. Blandon      Operation / Date: 3/12/18 Mini AVR   3/15/18 [facility]     SUBJECTIVE ASSESSMENT:  Patient seen this morning at bedside, doing well and not offering any complaints at this time. States he ambulated twice yesterday and is willing to walk 4-6 times today. He has had a normal bowel movement since surgery.     Vital Signs Last 24 Hrs  T(C): 36.8 (16 Mar 2018 14:07), Max: 36.8 (16 Mar 2018 14:07)  T(F): 98.2 (16 Mar 2018 14:07), Max: 98.2 (16 Mar 2018 14:07)  HR: 68 (16 Mar 2018 13:36) (68 - 82)  BP: 136/66 (16 Mar 2018 13:36) (85/48 - 136/66)  BP(mean): 88 (16 Mar 2018 13:36) (63 - 100)  RR: 17 (16 Mar 2018 13:36) (15 - 24)  SpO2: 96% (16 Mar 2018 13:36) (93% - 100%)  I&O's Detail    15 Mar 2018 07:01  -  16 Mar 2018 07:00  --------------------------------------------------------  IN:    Albumin 5%  - 250 mL: 250 mL    IV PiggyBack: 250 mL    Oral Fluid: 650 mL  Total IN: 1150 mL    OUT:    Bulb: 225 mL    Voided: 1355 mL  Total OUT: 1580 mL    Total NET: -430 mL      16 Mar 2018 07:01  -  16 Mar 2018 15:38  --------------------------------------------------------  IN:    Oral Fluid: 500 mL  Total IN: 500 mL    OUT:    Voided: 450 mL  Total OUT: 450 mL    Total NET: 50 mL    CHEST TUBE:  No  DANIEL DRAIN:  Yes x 2 on self suction   EPICARDIAL WIRES: No, removed this afternoon   TIE DOWNS: Yes   JAMIL: No    PHYSICAL EXAM:    General: Patient sitting comfortably in a chair, no acute distress     Neurological: Alert and oriented. No focal neurological deficits     Cardiovascular: S1S2, RRR, no murmurs appreciated on exam     Respiratory: Clear to ausculation bilaterally     Gastrointestinal: Abdomen soft, non tender, non distended     Extremities: Warm and well perfused. No edema or calf tenderness     Vascular: Peripheral pulses 2+ bilaterally     Incision Sites: mini sternotomy incision C/D/I, no drainage, surrounding erythema or sternal click   Chest tube/daniel sites C/D/I   R groin site C/D/I with tie down n place     LABS:                        8.1    7.6   )-----------( 55       ( 16 Mar 2018 06:54 )             26.7       COUMADIN:  No            137  |  100  |  25<H>  ----------------------------<  104<H>  4.2   |  28  |  1.14    Ca    8.5      16 Mar 2018 06:54  Phos  1.9     03-15  Mg     2.5     03-16        Urinalysis Basic - ( 15 Mar 2018 10:23 )    Color: Yellow / Appearance: Clear / S.020 / pH: x  Gluc: x / Ketone: 15 mg/dL  / Bili: Small / Urobili: 0.2 E.U./dL   Blood: x / Protein: 30 mg/dL / Nitrite: NEGATIVE   Leuk Esterase: Trace / RBC: > 10 /HPF / WBC 5-10 /HPF   Sq Epi: x / Non Sq Epi: 0-5 /HPF / Bacteria: Present /HPF        MEDICATIONS  (STANDING):  aspirin enteric coated 81 milliGRAM(s) Oral daily  docusate sodium 100 milliGRAM(s) Oral three times a day  heparin  Injectable 5000 Unit(s) SubCutaneous every 8 hours  lidocaine   Patch 1 Patch Transdermal daily  loratadine 10 milliGRAM(s) Oral daily  montelukast 10 milliGRAM(s) Oral daily  pantoprazole    Tablet 40 milliGRAM(s) Oral before breakfast  senna 1 Tablet(s) Oral at bedtime  simvastatin 20 milliGRAM(s) Oral at bedtime  sodium chloride 0.45%. 1000 milliLiter(s) (10 mL/Hr) IV Continuous <Continuous>    MEDICATIONS  (PRN):  ALPRAZolam 0.25 milliGRAM(s) Oral three times a day PRN anxiety  ketorolac   Injectable 30 milliGRAM(s) IV Push every 6 hours PRN breakthrough pain  magnesium hydroxide Suspension 30 milliLiter(s) Oral daily PRN Constipation  ondansetron Injectable 4 milliGRAM(s) IV Push every 6 hours PRN Nausea and/or Vomiting  oxyCODONE    5 mG/acetaminophen 325 mG 1 Tablet(s) Oral every 6 hours PRN Moderate Pain (4 - 6)  oxyCODONE    5 mG/acetaminophen 325 mG 2 Tablet(s) Oral every 6 hours PRN Severe Pain (7 - 10)  zolpidem 5 milliGRAM(s) Oral at bedtime PRN Insomnia    RADIOLOGY & ADDITIONAL TESTS:  3/16/19 CXR: < from: Xray Chest 1 View-PORTABLE IMMEDIATE (18 @ 08:38) >  1.  Query new more linear consolidation involving the left midlung zone   which may represent a newly developing area of atelectasis.   2.  Multifocal pneumonia cannot be excluded. Interval removal of right   IJV catheter.  3.  No pneumothorax.    < end of copied text >    A/P: 62 year old male, PMHx HTN, HLD, GERD, TYLER, chronic back pain, bicuspid AV with aortic stenosis and ascending aortic aneurysm evaluated by Dr. Blandon     Neurovascular: No delirium. Pain well controlled with current regimen.  -PRN's.    Cardiovascular: Hemodynamically stable. HR controlled.  -BP. HR. EKG. TTE. Cardiac Panel. Lipid Panel. BNP.   -ASA. Plavix. BBlocker. Statin.      Respiratory: 02 Sat = 98% on RA.  -If on oxygen wean to RA from for O2 Sat > 93%.  -Encourage C+DB and Use of IS 10x / hr while awake.  -CXR.    GI: Stable.  -NPO after MN.  -PPX.  -PO Diet.    Renal / :  -Monitor renal function.  -Monitor I/O's.    Endocrine:    -A1c.  -TSH.    Hematologic:  -CBC.  -Coagulation Panel.    ID:  -Tempature.  -CBC.  -Observe for SIRS/Sepsis Syndrome.    Prophylaxis:  -DVT prophylaxis with 5000 SubQ Heparin q8h.  -SCD's    Disposition:  -ICU for frequent monitoring.: Patient discussed on morning rounds with Dr. Blandon      Operation / Date: 3/12/18 Mini AVR   3/15/18 [facility]     SUBJECTIVE ASSESSMENT:  Patient seen this morning at bedside, doing well and not offering any complaints at this time. States he ambulated twice yesterday and is willing to walk 4-6 times today. He has had a normal bowel movement since surgery.     Vital Signs Last 24 Hrs  T(C): 36.8 (16 Mar 2018 14:07), Max: 36.8 (16 Mar 2018 14:07)  T(F): 98.2 (16 Mar 2018 14:07), Max: 98.2 (16 Mar 2018 14:07)  HR: 68 (16 Mar 2018 13:36) (68 - 82)  BP: 136/66 (16 Mar 2018 13:36) (85/48 - 136/66)  BP(mean): 88 (16 Mar 2018 13:36) (63 - 100)  RR: 17 (16 Mar 2018 13:36) (15 - 24)  SpO2: 96% (16 Mar 2018 13:36) (93% - 100%)  I&O's Detail    15 Mar 2018 07:01  -  16 Mar 2018 07:00  --------------------------------------------------------  IN:    Albumin 5%  - 250 mL: 250 mL    IV PiggyBack: 250 mL    Oral Fluid: 650 mL  Total IN: 1150 mL    OUT:    Bulb: 225 mL    Voided: 1355 mL  Total OUT: 1580 mL    Total NET: -430 mL      16 Mar 2018 07:01  -  16 Mar 2018 15:38  --------------------------------------------------------  IN:    Oral Fluid: 500 mL  Total IN: 500 mL    OUT:    Voided: 450 mL  Total OUT: 450 mL    Total NET: 50 mL    CHEST TUBE:  No  DANIEL DRAIN:  Yes x 2 on self suction   EPICARDIAL WIRES: No, removed this afternoon   TIE DOWNS: Yes   JAMIL: No    PHYSICAL EXAM:    General: Patient sitting comfortably in a chair, no acute distress     Neurological: Alert and oriented. No focal neurological deficits     Cardiovascular: S1S2, RRR, no murmurs appreciated on exam     Respiratory: Clear to ausculation bilaterally     Gastrointestinal: Abdomen soft, non tender, non distended     Extremities: Warm and well perfused. No edema or calf tenderness     Vascular: Peripheral pulses 2+ bilaterally     Incision Sites: mini sternotomy incision C/D/I, no drainage, surrounding erythema or sternal click   Chest tube/daniel sites C/D/I   R groin site C/D/I with tie down n place     LABS:                        8.1    7.6   )-----------( 55       ( 16 Mar 2018 06:54 )             26.7       COUMADIN:  No            137  |  100  |  25<H>  ----------------------------<  104<H>  4.2   |  28  |  1.14    Ca    8.5      16 Mar 2018 06:54  Phos  1.9     03-15  Mg     2.5     03-16        Urinalysis Basic - ( 15 Mar 2018 10:23 )    Color: Yellow / Appearance: Clear / S.020 / pH: x  Gluc: x / Ketone: 15 mg/dL  / Bili: Small / Urobili: 0.2 E.U./dL   Blood: x / Protein: 30 mg/dL / Nitrite: NEGATIVE   Leuk Esterase: Trace / RBC: > 10 /HPF / WBC 5-10 /HPF   Sq Epi: x / Non Sq Epi: 0-5 /HPF / Bacteria: Present /HPF        MEDICATIONS  (STANDING):  aspirin enteric coated 81 milliGRAM(s) Oral daily  docusate sodium 100 milliGRAM(s) Oral three times a day  heparin  Injectable 5000 Unit(s) SubCutaneous every 8 hours  lidocaine   Patch 1 Patch Transdermal daily  loratadine 10 milliGRAM(s) Oral daily  montelukast 10 milliGRAM(s) Oral daily  pantoprazole    Tablet 40 milliGRAM(s) Oral before breakfast  senna 1 Tablet(s) Oral at bedtime  simvastatin 20 milliGRAM(s) Oral at bedtime  sodium chloride 0.45%. 1000 milliLiter(s) (10 mL/Hr) IV Continuous <Continuous>    MEDICATIONS  (PRN):  ALPRAZolam 0.25 milliGRAM(s) Oral three times a day PRN anxiety  ketorolac   Injectable 30 milliGRAM(s) IV Push every 6 hours PRN breakthrough pain  magnesium hydroxide Suspension 30 milliLiter(s) Oral daily PRN Constipation  ondansetron Injectable 4 milliGRAM(s) IV Push every 6 hours PRN Nausea and/or Vomiting  oxyCODONE    5 mG/acetaminophen 325 mG 1 Tablet(s) Oral every 6 hours PRN Moderate Pain (4 - 6)  oxyCODONE    5 mG/acetaminophen 325 mG 2 Tablet(s) Oral every 6 hours PRN Severe Pain (7 - 10)  zolpidem 5 milliGRAM(s) Oral at bedtime PRN Insomnia    RADIOLOGY & ADDITIONAL TESTS:  3/16/19 CXR: < from: Xray Chest 1 View-PORTABLE IMMEDIATE (18 @ 08:38) >  1.  Query new more linear consolidation involving the left midlung zone   which may represent a newly developing area of atelectasis.   2.  Multifocal pneumonia cannot be excluded. Interval removal of right   IJV catheter.  3.  No pneumothorax.    < end of copied text >    A/P: 62 year old male, PMHx HTN, HLD, GERD, TYLER, chronic back pain, bicuspid AV with aortic stenosis and ascending aortic aneurysm evaluated by Dr. Blandon as an outpatient and presented to [facility] for elective surgery. He underwent mini AVR on 3, procedure uncomplicated and he was transferred to CT ICU post op stable on no drips. He was extubated POD#1 and was AV paced with epicardial wires due to no underlying rhythm. EP was consulted and he underwent PPM placement on POD#3. He was transferred to  as mini ICU on POD#1 and transitioned to floor care on POD#4. No acute events overnight.     Neurovascular: Stable. Pain well controlled with current regimen.  - continue percocet and tylenol PRN     Cardiovascular: Hemodynamically stable. HR controlled.  - Mini AVR, continue asa 81mg. Soft BP and HR 60s, holding BB for now will start as tolerated   - HLD, continue simvastatin 20mg qhs   - s/p PPM, interrogated today      Respiratory: 02 Sat = 96% on 3L NC   - Continue to wean O2 to maintain SaO2 > 93%   - Encourage C+DB and Use of IS 10x / hr while awake.  - CXR stable, follow up CXR in AM     GI: Stable.  - Continue PO diet   - Continue protonix 40mg for GI ppx   - Continue bowel regimen     Renal / : Cr 1.14, no active issues   - Monitor renal function.  - Monitor I/O's.    Endocrine: hgba1c 5.4, TSH 1.412  - no active issues     Heme: Mediastinal blakes x2 with drainage 200c overnight, subsided today but continue to suction and remove in AM prior to D/C     Prophylaxis:  - DVT prophylaxis with 5000 SubQ Heparin q8h.  - SCD's    Disposition: Home when medically ready

## 2018-03-16 NOTE — PROGRESS NOTE ADULT - ASSESSMENT
61 y/o M with severe AS / bicuspid AV now s/p bioprosthetic AVR on 3/12/18 with post op CHB.  He has escape rhythm at 60 bpm but still with AV dissociation.  PPM procedure is postponed on 3/14/18 due to fever of up to 101F.  He has been afebrile for 24 hours and finally s/p PPM implant (st Donnell) on 3/15/18.   - CXR post procedure without pneumothorax   - PPM interrogation today with normal function.  Normal sensing, threshold and impedance.   - He is currently in atrial tach with atrial rate at 100 bpm. Still CHB with ventricular escape ~ 45 bpm. PPM is programmed to DDI (non-tracking) at 70 bpm.  He is currently  at 70 bpm on telemetry.   RA sense: 4.8 mV       Threshold:  0.5  V at  0.4   ms.     Impedance: 380 ohms  RV sense: > 12 mV       Threshold:  0.5 V at 0.4   ms.     Impedance: 440 ohms  - Pt to f/u with Dr Watson on 4/16/18 (monday) at 2:30 PM here at 2nd floor. [phone]

## 2018-03-17 LAB
ANION GAP SERPL CALC-SCNC: 14 MMOL/L — SIGNIFICANT CHANGE UP (ref 5–17)
BUN SERPL-MCNC: 19 MG/DL — SIGNIFICANT CHANGE UP (ref 7–23)
CALCIUM SERPL-MCNC: 8.8 MG/DL — SIGNIFICANT CHANGE UP (ref 8.4–10.5)
CHLORIDE SERPL-SCNC: 98 MMOL/L — SIGNIFICANT CHANGE UP (ref 96–108)
CO2 SERPL-SCNC: 24 MMOL/L — SIGNIFICANT CHANGE UP (ref 22–31)
CREAT SERPL-MCNC: 1.09 MG/DL — SIGNIFICANT CHANGE UP (ref 0.5–1.3)
GLUCOSE SERPL-MCNC: 108 MG/DL — HIGH (ref 70–99)
HCT VFR BLD CALC: 28.1 % — LOW (ref 39–50)
HGB BLD-MCNC: 8.8 G/DL — LOW (ref 13–17)
MAGNESIUM SERPL-MCNC: 2.5 MG/DL — SIGNIFICANT CHANGE UP (ref 1.6–2.6)
MCHC RBC-ENTMCNC: 27.7 PG — SIGNIFICANT CHANGE UP (ref 27–34)
MCHC RBC-ENTMCNC: 31.3 G/DL — LOW (ref 32–36)
MCV RBC AUTO: 88.4 FL — SIGNIFICANT CHANGE UP (ref 80–100)
PLATELET # BLD AUTO: 112 K/UL — LOW (ref 150–400)
POTASSIUM SERPL-MCNC: 4.3 MMOL/L — SIGNIFICANT CHANGE UP (ref 3.5–5.3)
POTASSIUM SERPL-SCNC: 4.3 MMOL/L — SIGNIFICANT CHANGE UP (ref 3.5–5.3)
RBC # BLD: 3.18 M/UL — LOW (ref 4.2–5.8)
RBC # FLD: 13.7 % — SIGNIFICANT CHANGE UP (ref 10.3–16.9)
SODIUM SERPL-SCNC: 136 MMOL/L — SIGNIFICANT CHANGE UP (ref 135–145)
WBC # BLD: 7.7 K/UL — SIGNIFICANT CHANGE UP (ref 3.8–10.5)
WBC # FLD AUTO: 7.7 K/UL — SIGNIFICANT CHANGE UP (ref 3.8–10.5)

## 2018-03-17 PROCEDURE — 71045 X-RAY EXAM CHEST 1 VIEW: CPT | Mod: 26

## 2018-03-17 RX ORDER — ACETAMINOPHEN 500 MG
650 TABLET ORAL ONCE
Qty: 0 | Refills: 0 | Status: COMPLETED | OUTPATIENT
Start: 2018-03-17 | End: 2018-03-17

## 2018-03-17 RX ADMIN — Medication 650 MILLIGRAM(S): at 18:20

## 2018-03-17 RX ADMIN — LORATADINE 10 MILLIGRAM(S): 10 TABLET ORAL at 13:10

## 2018-03-17 RX ADMIN — SENNA PLUS 1 TABLET(S): 8.6 TABLET ORAL at 23:18

## 2018-03-17 RX ADMIN — HEPARIN SODIUM 5000 UNIT(S): 5000 INJECTION INTRAVENOUS; SUBCUTANEOUS at 07:01

## 2018-03-17 RX ADMIN — Medication 81 MILLIGRAM(S): at 13:10

## 2018-03-17 RX ADMIN — MONTELUKAST 10 MILLIGRAM(S): 4 TABLET, CHEWABLE ORAL at 13:10

## 2018-03-17 RX ADMIN — PANTOPRAZOLE SODIUM 40 MILLIGRAM(S): 20 TABLET, DELAYED RELEASE ORAL at 07:01

## 2018-03-17 RX ADMIN — HEPARIN SODIUM 5000 UNIT(S): 5000 INJECTION INTRAVENOUS; SUBCUTANEOUS at 22:00

## 2018-03-17 RX ADMIN — Medication 0.25 MILLIGRAM(S): at 23:59

## 2018-03-17 RX ADMIN — HEPARIN SODIUM 5000 UNIT(S): 5000 INJECTION INTRAVENOUS; SUBCUTANEOUS at 13:10

## 2018-03-17 RX ADMIN — SIMVASTATIN 20 MILLIGRAM(S): 20 TABLET, FILM COATED ORAL at 23:22

## 2018-03-17 RX ADMIN — Medication 100 MILLIGRAM(S): at 23:19

## 2018-03-17 RX ADMIN — ZOLPIDEM TARTRATE 5 MILLIGRAM(S): 10 TABLET ORAL at 22:00

## 2018-03-17 NOTE — PROGRESS NOTE ADULT - SUBJECTIVE AND OBJECTIVE BOX
Patient discussed on morning rounds with Dr. Mi  Operation / Date:  3/12/18 Mini AVR ,   3/15/18 PPM     SUBJECTIVE ASSESSMENT:  61yo male with no acute events overnight, seen at the bedside. He is feeling much better today, now that his tubes are out but doesn't feel ready to be discharged home until tomorrow. He denies headache, dizziness, chest pain, palpitations, SOB, abdominal pain, n/v/d, pain or swelling in the upper or lower extremities.    Vital Signs Last 24 Hrs  T(C): 38.3 (17 Mar 2018 17:30), Max: 38.3 (17 Mar 2018 17:30)  T(F): 101 (17 Mar 2018 17:30), Max: 101 (17 Mar 2018 17:30)  HR: 68 (17 Mar 2018 18:20) (68 - 69)  BP: 138/98 (17 Mar 2018 18:20) (116/57 - 138/98)  BP(mean): 87 (17 Mar 2018 04:38) (80 - 87)  RR: 16 (17 Mar 2018 18:20) (16 - 18)  SpO2: 96% (17 Mar 2018 18:20) (91% - 97%)    I&O's Detail  16 Mar 2018 07:01  -  17 Mar 2018 07:00  --------------------------------------------------------  IN:    Oral Fluid: 600 mL  Total IN: 600 mL  OUT:    Bulb: 15 mL    Voided: 950 mL  Total OUT: 965 mL  Total NET: -365 mL  17 Mar 2018 07:01  -  17 Mar 2018 19:20  --------------------------------------------------------  IN:  Total IN: 0 mL  OUT:    Voided: 400 mL  Total OUT: 400 mL  Total NET: -400 mL      CHEST TUBE:  No.  JOSHUA DRAIN:  No.  EPICARDIAL WIRES: No.  TIE DOWNS: Yes  JAMIL: No.    PHYSICAL EXAM:  General: NAD, sitting comfortably in his chair  Neurological: A&Ox3, no focal deficits  Cardiovascular: S1S2, RRR, no m/g/r  Respiratory: CTA b/l, no wheezing/rhonchi/rales  Gastrointestinal: +BS. NT/ND  Extremities: no edema or calf tenderness  Vascular: warm and well perfused. 2+ peripheral pulses.  Incision Sites: MSI: no sternal click, c/d/i, with mattress suture. Joshua sites: c/d/i    LABS:                        8.8    7.7   )-----------( 112      ( 17 Mar 2018 07:08 )             28.1       COUMADIN:  No.    03-17  136  |  98  |  19  ----------------------------<  108<H>  4.3   |  24  |  1.09  Ca    8.8      17 Mar 2018 07:08  Mg     2.5     03-17      MEDICATIONS  (STANDING):  aspirin enteric coated 81 milliGRAM(s) Oral daily  docusate sodium 100 milliGRAM(s) Oral three times a day  heparin  Injectable 5000 Unit(s) SubCutaneous every 8 hours  lidocaine   Patch 1 Patch Transdermal daily  loratadine 10 milliGRAM(s) Oral daily  montelukast 10 milliGRAM(s) Oral daily  pantoprazole    Tablet 40 milliGRAM(s) Oral before breakfast  senna 1 Tablet(s) Oral at bedtime  simvastatin 20 milliGRAM(s) Oral at bedtime  sodium chloride 0.45%. 1000 milliLiter(s) (10 mL/Hr) IV Continuous <Continuous>    MEDICATIONS  (PRN):  ALPRAZolam 0.25 milliGRAM(s) Oral three times a day PRN anxiety  ketorolac   Injectable 30 milliGRAM(s) IV Push every 6 hours PRN breakthrough pain  magnesium hydroxide Suspension 30 milliLiter(s) Oral daily PRN Constipation  ondansetron Injectable 4 milliGRAM(s) IV Push every 6 hours PRN Nausea and/or Vomiting  oxyCODONE    5 mG/acetaminophen 325 mG 1 Tablet(s) Oral every 6 hours PRN Moderate Pain (4 - 6)  oxyCODONE    5 mG/acetaminophen 325 mG 2 Tablet(s) Oral every 6 hours PRN Severe Pain (7 - 10)  zolpidem 5 milliGRAM(s) Oral at bedtime PRN Insomnia    RADIOLOGY & ADDITIONAL TESTS:  Xray Chest 1 View- PORTABLE-Routine (03.17.18 @ 07:13)  Portable examination the chest demonstrates cardiomegaly. Status post   sternotomy. No interval change position remaining support devices in   comparison to prior examination of the chest 3/16/2018. No interval   change lung pathology.  Impression: No interval change lung pathology

## 2018-03-17 NOTE — PROGRESS NOTE ADULT - ASSESSMENT
62 year old male, PMHx HTN, HLD, GERD, TYLER, chronic back pain, bicuspid AV with aortic stenosis and ascending aortic aneurysm evaluated by Dr. Blandon as an outpatient that presented to [facility] for elective surgery. He underwent a mini AVR on 3/12/18, procedure uncomplicated and he was transferred to CT ICU post op, HD stable on no drips. He was extubated POD#1 and was AV paced with epicardial wires due to no underlying rhythm. EP was consulted and he underwent PPM placement on POD#3. He was transferred to  as mini ICU on POD#1 and transitioned to floor care on POD#4. POD#5, he spiked a temp of 101 and given tylenol, WBC stable and CXR improving.    Neuro: insomnia/anxiety, pain well controlled  -continue ketorolac, percocet and lidoderm  -zolpidem for insomnia and xanax for anxiety    CV: HTN, HLD, bicuspid AV and ascending aortic aneurysm s/p mini AVR s/p PPM  -continue asa and simvastatin  -monitor tele, HR and BP  -BB being held for HR in 60's and SBP in 120's, restart as tolerated  -PPM interrogated yesterday, no issues    Pulm: SATing 95-97% on room air  -encouraged use of IS and ambulation  -CXR stable from 3/16, monitor    GI: no active issues  -Diet: DASH diet  -GI ppx: protonix  -Bowel reg: colace, senna    Renal/: no active issues  -BUN/Cr downtrending, now 19/1.09  -monitor lytes and replete prn  -monitor IOs    Endo: no active issues  -HgA1C 5.4, TSH 1.412  -fingersticks have been well controlled    Heme: H/H improving, 8.8/28.1  -DVT ppx: subcut heparin, SCDs    ID: temp of 101 @ 5:30pm  -WBC stable, 7.7  -given tylenol and encouraged frequent IS  -monitor temp, cbc, cxr.    Dispo: Home tomorrow

## 2018-03-18 ENCOUNTER — TRANSCRIPTION ENCOUNTER (OUTPATIENT)
Age: 63
End: 2018-03-18

## 2018-03-18 VITALS — TEMPERATURE: 97 F

## 2018-03-18 LAB
ANION GAP SERPL CALC-SCNC: 12 MMOL/L — SIGNIFICANT CHANGE UP (ref 5–17)
APPEARANCE UR: CLEAR — SIGNIFICANT CHANGE UP
BILIRUB UR-MCNC: NEGATIVE — SIGNIFICANT CHANGE UP
BUN SERPL-MCNC: 16 MG/DL — SIGNIFICANT CHANGE UP (ref 7–23)
CALCIUM SERPL-MCNC: 8.9 MG/DL — SIGNIFICANT CHANGE UP (ref 8.4–10.5)
CHLORIDE SERPL-SCNC: 98 MMOL/L — SIGNIFICANT CHANGE UP (ref 96–108)
CO2 SERPL-SCNC: 24 MMOL/L — SIGNIFICANT CHANGE UP (ref 22–31)
COLOR SPEC: YELLOW — SIGNIFICANT CHANGE UP
CREAT SERPL-MCNC: 1.07 MG/DL — SIGNIFICANT CHANGE UP (ref 0.5–1.3)
DIFF PNL FLD: NEGATIVE — SIGNIFICANT CHANGE UP
GLUCOSE SERPL-MCNC: 108 MG/DL — HIGH (ref 70–99)
GLUCOSE UR QL: NEGATIVE — SIGNIFICANT CHANGE UP
HCT VFR BLD CALC: 26.3 % — LOW (ref 39–50)
HGB BLD-MCNC: 8.2 G/DL — LOW (ref 13–17)
KETONES UR-MCNC: NEGATIVE — SIGNIFICANT CHANGE UP
LEUKOCYTE ESTERASE UR-ACNC: NEGATIVE — SIGNIFICANT CHANGE UP
MAGNESIUM SERPL-MCNC: 2.3 MG/DL — SIGNIFICANT CHANGE UP (ref 1.6–2.6)
MCHC RBC-ENTMCNC: 27.4 PG — SIGNIFICANT CHANGE UP (ref 27–34)
MCHC RBC-ENTMCNC: 31.2 G/DL — LOW (ref 32–36)
MCV RBC AUTO: 88 FL — SIGNIFICANT CHANGE UP (ref 80–100)
NITRITE UR-MCNC: NEGATIVE — SIGNIFICANT CHANGE UP
PH UR: 5.5 — SIGNIFICANT CHANGE UP (ref 5–8)
PLATELET # BLD AUTO: 115 K/UL — LOW (ref 150–400)
POTASSIUM SERPL-MCNC: 4.2 MMOL/L — SIGNIFICANT CHANGE UP (ref 3.5–5.3)
POTASSIUM SERPL-SCNC: 4.2 MMOL/L — SIGNIFICANT CHANGE UP (ref 3.5–5.3)
PROT UR-MCNC: NEGATIVE MG/DL — SIGNIFICANT CHANGE UP
RBC # BLD: 2.99 M/UL — LOW (ref 4.2–5.8)
RBC # FLD: 13.6 % — SIGNIFICANT CHANGE UP (ref 10.3–16.9)
SODIUM SERPL-SCNC: 134 MMOL/L — LOW (ref 135–145)
SP GR SPEC: 1.01 — SIGNIFICANT CHANGE UP (ref 1–1.03)
UROBILINOGEN FLD QL: 0.2 E.U./DL — SIGNIFICANT CHANGE UP
WBC # BLD: 7.3 K/UL — SIGNIFICANT CHANGE UP (ref 3.8–10.5)
WBC # FLD AUTO: 7.3 K/UL — SIGNIFICANT CHANGE UP (ref 3.8–10.5)

## 2018-03-18 PROCEDURE — 71045 X-RAY EXAM CHEST 1 VIEW: CPT | Mod: 26

## 2018-03-18 RX ORDER — SIMVASTATIN 20 MG/1
1 TABLET, FILM COATED ORAL
Qty: 30 | Refills: 0 | OUTPATIENT
Start: 2018-03-18 | End: 2018-04-16

## 2018-03-18 RX ORDER — ZOLPIDEM TARTRATE 10 MG/1
1 TABLET ORAL
Qty: 7 | Refills: 0 | OUTPATIENT
Start: 2018-03-18 | End: 2018-03-24

## 2018-03-18 RX ORDER — SIMVASTATIN 20 MG/1
1 TABLET, FILM COATED ORAL
Qty: 0 | Refills: 0 | COMMUNITY

## 2018-03-18 RX ORDER — DIPHENHYDRAMINE HCL 50 MG
0 CAPSULE ORAL
Qty: 0 | Refills: 0 | COMMUNITY

## 2018-03-18 RX ORDER — DOCUSATE SODIUM 100 MG
1 CAPSULE ORAL
Qty: 30 | Refills: 0 | OUTPATIENT
Start: 2018-03-18 | End: 2018-03-27

## 2018-03-18 RX ORDER — OLMESARTAN MEDOXOMIL 5 MG/1
1 TABLET, FILM COATED ORAL
Qty: 30 | Refills: 0 | OUTPATIENT
Start: 2018-03-18 | End: 2018-04-16

## 2018-03-18 RX ORDER — ZOLPIDEM TARTRATE 10 MG/1
1 TABLET ORAL
Qty: 0 | Refills: 0 | COMMUNITY

## 2018-03-18 RX ORDER — LOSARTAN POTASSIUM 100 MG/1
25 TABLET, FILM COATED ORAL DAILY
Qty: 0 | Refills: 0 | Status: DISCONTINUED | OUTPATIENT
Start: 2018-03-18 | End: 2018-03-18

## 2018-03-18 RX ORDER — MONTELUKAST 4 MG/1
1 TABLET, CHEWABLE ORAL
Qty: 0 | Refills: 0 | COMMUNITY

## 2018-03-18 RX ORDER — CIPROFLOXACIN LACTATE 400MG/40ML
1 VIAL (ML) INTRAVENOUS
Qty: 10 | Refills: 0 | OUTPATIENT
Start: 2018-03-18 | End: 2018-03-22

## 2018-03-18 RX ORDER — PANTOPRAZOLE SODIUM 20 MG/1
1 TABLET, DELAYED RELEASE ORAL
Qty: 30 | Refills: 0 | OUTPATIENT
Start: 2018-03-18 | End: 2018-04-16

## 2018-03-18 RX ORDER — MONTELUKAST 4 MG/1
1 TABLET, CHEWABLE ORAL
Qty: 30 | Refills: 0 | OUTPATIENT
Start: 2018-03-18 | End: 2018-04-16

## 2018-03-18 RX ORDER — CIPROFLOXACIN LACTATE 400MG/40ML
500 VIAL (ML) INTRAVENOUS EVERY 12 HOURS
Qty: 0 | Refills: 0 | Status: DISCONTINUED | OUTPATIENT
Start: 2018-03-18 | End: 2018-03-18

## 2018-03-18 RX ORDER — LEVOCETIRIZINE DIHYDROCHLORIDE 0.5 MG/ML
1 SOLUTION ORAL
Qty: 30 | Refills: 0 | OUTPATIENT
Start: 2018-03-18 | End: 2018-04-16

## 2018-03-18 RX ORDER — ASPIRIN/CALCIUM CARB/MAGNESIUM 324 MG
1 TABLET ORAL
Qty: 30 | Refills: 0 | OUTPATIENT
Start: 2018-03-18 | End: 2018-04-16

## 2018-03-18 RX ORDER — LEVOCETIRIZINE DIHYDROCHLORIDE 0.5 MG/ML
1 SOLUTION ORAL
Qty: 0 | Refills: 0 | COMMUNITY

## 2018-03-18 RX ORDER — OLMESARTAN MEDOXOMIL 5 MG/1
1 TABLET, FILM COATED ORAL
Qty: 0 | Refills: 0 | COMMUNITY

## 2018-03-18 RX ORDER — PANTOPRAZOLE SODIUM 20 MG/1
1 TABLET, DELAYED RELEASE ORAL
Qty: 0 | Refills: 0 | COMMUNITY

## 2018-03-18 RX ADMIN — LORATADINE 10 MILLIGRAM(S): 10 TABLET ORAL at 10:56

## 2018-03-18 RX ADMIN — MONTELUKAST 10 MILLIGRAM(S): 4 TABLET, CHEWABLE ORAL at 10:57

## 2018-03-18 RX ADMIN — HEPARIN SODIUM 5000 UNIT(S): 5000 INJECTION INTRAVENOUS; SUBCUTANEOUS at 05:57

## 2018-03-18 RX ADMIN — Medication 81 MILLIGRAM(S): at 10:57

## 2018-03-18 RX ADMIN — Medication 100 MILLIGRAM(S): at 05:57

## 2018-03-18 RX ADMIN — PANTOPRAZOLE SODIUM 40 MILLIGRAM(S): 20 TABLET, DELAYED RELEASE ORAL at 05:57

## 2018-03-18 NOTE — DISCHARGE NOTE ADULT - MEDICATION SUMMARY - MEDICATIONS TO CHANGE
I will SWITCH the dose or number of times a day I take the medications listed below when I get home from the hospital:    Benicar 40 mg oral tablet  -- 1 tab(s) by mouth once a day

## 2018-03-18 NOTE — DISCHARGE NOTE ADULT - HOSPITAL COURSE
62 year old male, PMHx HTN, HLD, GERD, TYLER, chronic back pain, bicuspid AV with aortic stenosis and ascending aortic aneurysm evaluated by Dr. Blandon as an outpatient that presented to [facility] for elective surgery. He underwent a mini AVR on 3/12/18, procedure uncomplicated and he was transferred to CT ICU post op, HD stable on no drips. He was extubated POD#1 and was AV paced with epicardial wires due to no underlying rhythm. EP was consulted and he underwent PPM placement on POD#3. He was transferred to  as mini ICU on POD#1 and transitioned to floor care on POD#4. POD#5, he spiked a temp of 101 and given tylenol, WBC stable and CXR improving. UA positive and started on Cipro 500 BID.  Repeat UA/UCx sent, if negative will d/c antibiotics.  Now on POD#6, he is ready for home.

## 2018-03-18 NOTE — DISCHARGE NOTE ADULT - PATIENT PORTAL LINK FT
You can access the [facility] Patient Portal, offered by [facility], by registering with the following website: [email or web address]

## 2018-03-18 NOTE — DISCHARGE NOTE ADULT - CARE PROVIDERS DIRECT ADDRESSES
[email or web address]

## 2018-03-18 NOTE — DISCHARGE NOTE ADULT - MEDICATION SUMMARY - MEDICATIONS TO TAKE
I will START or STAY ON the medications listed below when I get home from the hospital:    aspirin 81 mg oral delayed release tablet  -- 1 tab(s) by mouth once a day  -- Indication: For Bioprostetic AVR    oxyCODONE-acetaminophen 5 mg-325 mg oral tablet  -- 1 tab(s) by mouth every 6 hours, As needed, Moderate Pain (4 - 6) MDD:4  -- Indication: For Pain management    Benicar 40 mg oral tablet  -- 1/2  tab(s) by mouth once a day  -- Indication: For Hypertension    Benadryl 25 mg oral capsule  -- 25 milligram(s) by mouth 2 times a day, As Needed  -- Indication: For PRN itching    levocetirizine 5 mg oral tablet  -- 1 tab(s) by mouth once a day (in the evening)  -- Indication: For Antihistamine    simvastatin 20 mg oral tablet  -- 1 tab(s) by mouth once a day (at bedtime)  -- Indication: For High cholesterol    zolpidem 10 mg oral tablet  -- 1 tab(s) by mouth once a day (at bedtime) MDD:1  -- Indication: For Sleep aide    docusate sodium 100 mg oral capsule  -- 1 cap(s) by mouth 3 times a day  -- Indication: For Stoof softener    montelukast 10 mg oral tablet  -- 1 tab(s) by mouth once a day  -- Indication: For Postnasal drip    Flonase 50 mcg/inh nasal spray  -- 1 spray(s) into nose once a day  -- Indication: For Postnasal drip    Nasacort 55 mcg/inh nasal aerosol  -- Indication: For Postnasal drip    pantoprazole 40 mg oral delayed release tablet  -- 1 tab(s) by mouth once a day  -- Indication: For GERD (gastroesophageal reflux disease)    ciprofloxacin 500 mg oral tablet  -- 1 tab(s) by mouth every 12 hours  -- Indication: For UTI    Metanx oral tablet  -- 1 tab(s) by mouth once a day  -- Indication: For Nutritional replement

## 2018-03-18 NOTE — PROGRESS NOTE ADULT - SUBJECTIVE AND OBJECTIVE BOX
Patient discussed on morning rounds with Dr. Mi    Operation / Date:  3/12/18: mini AVR, EF 40%                              3/15/18: PPM    SUBJECTIVE ASSESSMENT:  62y Male with no active complaints.  No CP, SOB, palpitations abdominal pain.  Voiding freely, +BM.    Vital Signs Last 24 Hrs  T(C): 36.2 (18 Mar 2018 10:08), Max: 38.3 (17 Mar 2018 17:30)  T(F): 97.1 (18 Mar 2018 10:08), Max: 101 (17 Mar 2018 17:30)  HR: 69 (18 Mar 2018 08:50) (68 - 69)  BP: 129/56 (18 Mar 2018 08:50) (125/61 - 161/76)  BP(mean): --  RR: 16 (18 Mar 2018 08:50) (14 - 17)  SpO2: 100% (18 Mar 2018 08:50) (95% - 100%)  I&O's Detail    17 Mar 2018 07:01  -  18 Mar 2018 07:00  --------------------------------------------------------  IN:    Oral Fluid: 960 mL  Total IN: 960 mL    OUT:    Voided: 2000 mL  Total OUT: 2000 mL    Total NET: -1040 mL    18 Mar 2018 07:01  -  18 Mar 2018 11:54  --------------------------------------------------------  IN:  Total IN: 0 mL    OUT:    Voided: 140 mL  Total OUT: 140 mL    Total NET: -140 mL    CHEST TUBE:  No  ELYSSA DRAIN:  No.  EPICARDIAL WIRES: No.  TIE DOWNS: No.  JAMIL: No.    PHYSICAL EXAM:  General: WDWN in NAD  Neurological: AA&O x3 no focal deficits  Cardiovascular: S1S2, no murmurs  Respiratory: b/l breath sounds  Gastrointestinal: soft. NTND  Extremities: moves all 4 ext  Vascular: WWP  Incision Sites: midline incision, c/d/i no erythema; right groin cannulation site c/d/i    LABS:             8.2    7.3   )-----------( 115      ( 18 Mar 2018 05:51 )             26.3     COUMADIN:  No    03-18    134<L>  |  98  |  16  ----------------------------<  108<H>  4.2   |  24  |  1.07    Ca    8.9      18 Mar 2018 05:51  Mg     2.3     03-18    MEDICATIONS  (STANDING):  aspirin enteric coated 81 milliGRAM(s) Oral daily  ciprofloxacin     Tablet 500 milliGRAM(s) Oral every 12 hours  docusate sodium 100 milliGRAM(s) Oral three times a day  heparin  Injectable 5000 Unit(s) SubCutaneous every 8 hours  lidocaine   Patch 1 Patch Transdermal daily  loratadine 10 milliGRAM(s) Oral daily  montelukast 10 milliGRAM(s) Oral daily  pantoprazole    Tablet 40 milliGRAM(s) Oral before breakfast  senna 1 Tablet(s) Oral at bedtime  simvastatin 20 milliGRAM(s) Oral at bedtime  sodium chloride 0.45%. 1000 milliLiter(s) (10 mL/Hr) IV Continuous <Continuous>    MEDICATIONS  (PRN):  ALPRAZolam 0.25 milliGRAM(s) Oral three times a day PRN anxiety  ketorolac   Injectable 30 milliGRAM(s) IV Push every 6 hours PRN breakthrough pain  magnesium hydroxide Suspension 30 milliLiter(s) Oral daily PRN Constipation  ondansetron Injectable 4 milliGRAM(s) IV Push every 6 hours PRN Nausea and/or Vomiting  oxyCODONE    5 mG/acetaminophen 325 mG 1 Tablet(s) Oral every 6 hours PRN Moderate Pain (4 - 6)  oxyCODONE    5 mG/acetaminophen 325 mG 2 Tablet(s) Oral every 6 hours PRN Severe Pain (7 - 10)  zolpidem 5 milliGRAM(s) Oral at bedtime PRN Insomnia

## 2018-03-18 NOTE — DISCHARGE NOTE ADULT - PROVIDER TOKENS
TOKEN:'[id number]',FREE:[LAST:[Pollaro],FIRST:[Jag],PHONE:[(   )    -],FAX:[(   )    -]],TOKEN:'[id number]'

## 2018-03-18 NOTE — DISCHARGE NOTE ADULT - CARE PROVIDER_API CALL
Lex Blandon (MD), Surgery; Thoracic and Cardiac Surgery  [address]  4th [place] [address]  Phone: [phone]  Fax: [phone]    Jag Hughes  Phone: (   )    -  Fax: (   )    -    Royer Watson), Cardiac Electrophysiology; Cardiovascular Disease; Internal Medicine  [address] [address]  Phone: [phone]  Fax: [phone]

## 2018-03-18 NOTE — DISCHARGE NOTE ADULT - CARE PLAN
Principal Discharge DX:	Bicuspid aortic valve  Goal:	s/p minimally invasive aortic valve replacement  Assessment and plan of treatment:	-Please follow up with Dr. Blandon on 3/28/18 @ 12:00 pm.  The office is located at [facility], [facility], 4th floor. Call us with any questions #[phone].      -Walk daily as tolerated and use your incentive spirometer every hour.    -No driving or strenuous activity/exercise for 6 weeks, or until cleared by your surgeon.     -Gently clean your incisions with anti-bacterial soap and water, pat dry.  You may leave them open to air.    -Call your doctor if you have shortness of breath, chest pain not relieved by pain medication, dizziness, fever >101.5, or increased redness or drainage from incisions.    Please follow up with your PCP, Dr. Izquierdo on 3/21/18 @ 1pm.    Please follow up with Dr. Watson on 4/16/18 @ 2:30pm.  The office is located at the hospital on the 2nd floor. Please call [phone] with any questions.

## 2018-03-18 NOTE — DISCHARGE NOTE ADULT - NSFTFAOTHERFT_GEN_ALL_CORE
s/p minimally invasive aortic valve replacement and will need assistance of family to leave home for a few days

## 2018-03-18 NOTE — PROGRESS NOTE ADULT - ASSESSMENT
62 year old male, PMHx HTN, HLD, GERD, TYLER, chronic back pain, bicuspid AV with aortic stenosis and ascending aortic aneurysm evaluated by Dr. Blandon as an outpatient that presented to [facility] for elective surgery. He underwent a mini AVR on 3/12/18, procedure uncomplicated and he was transferred to CT ICU post op, HD stable on no drips. He was extubated POD#1 and was AV paced with epicardial wires due to no underlying rhythm. EP was consulted and he underwent PPM placement on POD#3. He was transferred to  as mini ICU on POD#1 and transitioned to floor care on POD#4. POD#5, he spiked a temp of 101 and given tylenol, WBC stable and CXR improving. UA positive and will start on Cipro 500 BID.  Repeat UA/UCx sent, if negative will d/c antibiotics.  Now on POD#6, he is ready for home.      Neuro: insomnia/anxiety, pain well controlled  - continue ketorolac, percocet and lidoderm  - zolpidem for insomnia and xanax for anxiety    CV: HTN, HLD, bicuspid AV and ascending aortic aneurysm s/p mini AVR s/p PPM  - continue asa and simvastatin  - monitor tele, HR and BP  - BB being held for HR in 60's  - Restarting home med Benicar at 1/2 dose: 20mg PO daily  - PPM interrogated 3/16    Pulm: Sats 95-97% on RA  - encouraged use of IS 10x/hr and ambulation  - CXR stable     GI: no active issues  - Diet: DASH diet  - GI ppx: protonix  - Bowel reg: colace, senna    Renal/: no active issues  - BUN/Cr stable  - monitor lytes and replete prn  - monitor IOs    Endo:   - HgA1C 5.4, TSH 1.412  - fingersticks have been well controlled    Heme: H/H improving, 8.2/26.3  - DVT ppx: heparin subq, SCDs    ID: afebrile overnight  - WBC stable 7.3  - UA: +leuk esterase, bacteria; could not be a clean catch sample  - Resending UA/UCx: if UCx negative will call patient to stop abx  - Cipro 500 BID for +UA    Dispo: Home today

## 2018-03-18 NOTE — PROGRESS NOTE ADULT - PROVIDER SPECIALTY LIST ADULT
CT Surgery
Electrophysiology
Critical Care
Electrophysiology

## 2018-03-19 LAB
CULTURE RESULTS: NO GROWTH — SIGNIFICANT CHANGE UP
CULTURE RESULTS: SIGNIFICANT CHANGE UP
CULTURE RESULTS: SIGNIFICANT CHANGE UP
SPECIMEN SOURCE: SIGNIFICANT CHANGE UP
SURGICAL PATHOLOGY STUDY: SIGNIFICANT CHANGE UP

## 2018-03-22 DIAGNOSIS — E78.00 PURE HYPERCHOLESTEROLEMIA, UNSPECIFIED: ICD-10-CM

## 2018-03-22 DIAGNOSIS — M54.9 DORSALGIA, UNSPECIFIED: ICD-10-CM

## 2018-03-22 DIAGNOSIS — Z28.21 IMMUNIZATION NOT CARRIED OUT BECAUSE OF PATIENT REFUSAL: ICD-10-CM

## 2018-03-22 DIAGNOSIS — G47.33 OBSTRUCTIVE SLEEP APNEA (ADULT) (PEDIATRIC): ICD-10-CM

## 2018-03-22 DIAGNOSIS — F41.9 ANXIETY DISORDER, UNSPECIFIED: ICD-10-CM

## 2018-03-22 DIAGNOSIS — E78.5 HYPERLIPIDEMIA, UNSPECIFIED: ICD-10-CM

## 2018-03-22 DIAGNOSIS — R09.82 POSTNASAL DRIP: ICD-10-CM

## 2018-03-22 DIAGNOSIS — K21.9 GASTRO-ESOPHAGEAL REFLUX DISEASE WITHOUT ESOPHAGITIS: ICD-10-CM

## 2018-03-22 DIAGNOSIS — G89.29 OTHER CHRONIC PAIN: ICD-10-CM

## 2018-03-22 DIAGNOSIS — I44.2 ATRIOVENTRICULAR BLOCK, COMPLETE: ICD-10-CM

## 2018-03-22 DIAGNOSIS — Z82.49 FAMILY HISTORY OF ISCHEMIC HEART DISEASE AND OTHER DISEASES OF THE CIRCULATORY SYSTEM: ICD-10-CM

## 2018-03-22 DIAGNOSIS — Q23.1 CONGENITAL INSUFFICIENCY OF AORTIC VALVE: ICD-10-CM

## 2018-03-22 DIAGNOSIS — I35.0 NONRHEUMATIC AORTIC (VALVE) STENOSIS: ICD-10-CM

## 2018-03-22 DIAGNOSIS — I10 ESSENTIAL (PRIMARY) HYPERTENSION: ICD-10-CM

## 2018-03-22 DIAGNOSIS — I71.2 THORACIC AORTIC ANEURYSM, WITHOUT RUPTURE: ICD-10-CM

## 2018-03-22 PROCEDURE — 80053 COMPREHEN METABOLIC PANEL: CPT

## 2018-03-22 PROCEDURE — 85610 PROTHROMBIN TIME: CPT

## 2018-03-22 PROCEDURE — 80048 BASIC METABOLIC PNL TOTAL CA: CPT

## 2018-03-22 PROCEDURE — 82330 ASSAY OF CALCIUM: CPT

## 2018-03-22 PROCEDURE — 97161 PT EVAL LOW COMPLEX 20 MIN: CPT

## 2018-03-22 PROCEDURE — 93005 ELECTROCARDIOGRAM TRACING: CPT

## 2018-03-22 PROCEDURE — 81003 URINALYSIS AUTO W/O SCOPE: CPT

## 2018-03-22 PROCEDURE — 81001 URINALYSIS AUTO W/SCOPE: CPT

## 2018-03-22 PROCEDURE — C1898: CPT

## 2018-03-22 PROCEDURE — 97116 GAIT TRAINING THERAPY: CPT

## 2018-03-22 PROCEDURE — 82803 BLOOD GASES ANY COMBINATION: CPT

## 2018-03-22 PROCEDURE — 86923 COMPATIBILITY TEST ELECTRIC: CPT

## 2018-03-22 PROCEDURE — 84132 ASSAY OF SERUM POTASSIUM: CPT

## 2018-03-22 PROCEDURE — 83605 ASSAY OF LACTIC ACID: CPT

## 2018-03-22 PROCEDURE — 93306 TTE W/DOPPLER COMPLETE: CPT

## 2018-03-22 PROCEDURE — 85025 COMPLETE CBC W/AUTO DIFF WBC: CPT

## 2018-03-22 PROCEDURE — 88305 TISSUE EXAM BY PATHOLOGIST: CPT

## 2018-03-22 PROCEDURE — 86901 BLOOD TYPING SEROLOGIC RH(D): CPT

## 2018-03-22 PROCEDURE — C1889: CPT

## 2018-03-22 PROCEDURE — 36415 COLL VENOUS BLD VENIPUNCTURE: CPT

## 2018-03-22 PROCEDURE — 86850 RBC ANTIBODY SCREEN: CPT

## 2018-03-22 PROCEDURE — C1894: CPT

## 2018-03-22 PROCEDURE — 85027 COMPLETE CBC AUTOMATED: CPT

## 2018-03-22 PROCEDURE — 87086 URINE CULTURE/COLONY COUNT: CPT

## 2018-03-22 PROCEDURE — 84100 ASSAY OF PHOSPHORUS: CPT

## 2018-03-22 PROCEDURE — C1769: CPT

## 2018-03-22 PROCEDURE — C1785: CPT

## 2018-03-22 PROCEDURE — C1768: CPT

## 2018-03-22 PROCEDURE — 87070 CULTURE OTHR SPECIMN AEROBIC: CPT

## 2018-03-22 PROCEDURE — 87040 BLOOD CULTURE FOR BACTERIA: CPT

## 2018-03-22 PROCEDURE — 88311 DECALCIFY TISSUE: CPT

## 2018-03-22 PROCEDURE — 86900 BLOOD TYPING SEROLOGIC ABO: CPT

## 2018-03-22 PROCEDURE — 84295 ASSAY OF SERUM SODIUM: CPT

## 2018-03-22 PROCEDURE — 71045 X-RAY EXAM CHEST 1 VIEW: CPT

## 2018-03-22 PROCEDURE — 83735 ASSAY OF MAGNESIUM: CPT

## 2018-03-22 PROCEDURE — 82962 GLUCOSE BLOOD TEST: CPT

## 2018-03-22 PROCEDURE — 94660 CPAP INITIATION&MGMT: CPT

## 2018-03-22 PROCEDURE — P9045: CPT

## 2018-03-22 PROCEDURE — 85730 THROMBOPLASTIN TIME PARTIAL: CPT

## 2018-03-28 ENCOUNTER — APPOINTMENT (OUTPATIENT)
Dept: HEART AND VASCULAR | Facility: CLINIC | Age: 63
End: 2018-03-28

## 2018-03-29 ENCOUNTER — APPOINTMENT (OUTPATIENT)
Dept: CARDIOTHORACIC SURGERY | Facility: CLINIC | Age: 63
End: 2018-03-29
Payer: COMMERCIAL

## 2018-03-29 VITALS
BODY MASS INDEX: 29.95 KG/M2 | OXYGEN SATURATION: 98 % | DIASTOLIC BLOOD PRESSURE: 70 MMHG | SYSTOLIC BLOOD PRESSURE: 139 MMHG | HEIGHT: 73 IN | TEMPERATURE: 98.4 F | HEART RATE: 70 BPM | WEIGHT: 226.01 LBS | RESPIRATION RATE: 13 BRPM

## 2018-03-29 PROCEDURE — 99024 POSTOP FOLLOW-UP VISIT: CPT

## 2018-03-29 RX ORDER — IRON/C/B12/FOLATE/ZINC/SUCCIN 75MG-175MG
75-1 TABLET ORAL DAILY
Refills: 0 | Status: ACTIVE | COMMUNITY
Start: 2018-03-29

## 2018-03-29 RX ORDER — FUROSEMIDE 40 MG/1
40 TABLET ORAL DAILY
Refills: 0 | Status: ACTIVE | COMMUNITY
Start: 2018-03-29

## 2018-03-29 RX ORDER — CIPROFLOXACIN HYDROCHLORIDE 500 MG/1
500 TABLET, FILM COATED ORAL TWICE DAILY
Qty: 14 | Refills: 0 | Status: COMPLETED | COMMUNITY
End: 2018-03-29

## 2018-03-29 RX ORDER — CEFPODOXIME PROXETIL 200 MG/1
200 TABLET, FILM COATED ORAL
Refills: 0 | Status: COMPLETED | COMMUNITY
Start: 2018-03-29 | End: 2018-03-29

## 2018-03-29 RX ORDER — AZITHROMYCIN 500 MG/1
500 TABLET, FILM COATED ORAL DAILY
Refills: 0 | Status: COMPLETED | COMMUNITY
Start: 2018-03-29 | End: 2018-03-29

## 2018-03-29 RX ORDER — SACCHAROMYCES BOULARDII 50 MG
250 CAPSULE ORAL TWICE DAILY
Refills: 0 | Status: ACTIVE | COMMUNITY
Start: 2018-03-29

## 2018-03-29 RX ORDER — OXYCODONE HYDROCHLORIDE AND ACETAMINOPHEN 5; 325 MG/1; MG/1
5-325 TABLET ORAL EVERY 6 HOURS
Refills: 0 | Status: ACTIVE | COMMUNITY

## 2018-03-29 RX ORDER — OLMESARTAN MEDOXOMIL 40 MG/1
40 TABLET, FILM COATED ORAL DAILY
Refills: 0 | Status: COMPLETED | COMMUNITY
Start: 2018-03-01 | End: 2018-03-29

## 2018-03-29 RX ORDER — TURMERIC 100 %
POWDER (GRAM) MISCELLANEOUS
Refills: 0 | Status: COMPLETED | COMMUNITY
Start: 2018-03-01 | End: 2018-03-29

## 2018-03-29 RX ORDER — LEVOFLOXACIN 750 MG/1
750 TABLET, FILM COATED ORAL DAILY
Qty: 20 | Refills: 0 | Status: ACTIVE | COMMUNITY
Start: 2018-03-29 | End: 1900-01-01

## 2018-03-29 RX ORDER — FLUTICASONE PROPIONATE 50 MCG
50 SPRAY, SUSPENSION NASAL DAILY
Refills: 0 | Status: ACTIVE | COMMUNITY

## 2018-03-29 RX ORDER — DOCUSATE SODIUM 100 MG/1
100 CAPSULE ORAL 3 TIMES DAILY
Qty: 30 | Refills: 0 | Status: ACTIVE | COMMUNITY
Start: 2018-03-29

## 2018-03-29 RX ORDER — POTASSIUM CHLORIDE 1500 MG/1
20 TABLET, FILM COATED, EXTENDED RELEASE ORAL
Qty: 30 | Refills: 0 | Status: ACTIVE | COMMUNITY
Start: 2018-03-29

## 2018-04-03 ENCOUNTER — APPOINTMENT (OUTPATIENT)
Dept: CARDIOTHORACIC SURGERY | Facility: CLINIC | Age: 63
End: 2018-04-03
Payer: COMMERCIAL

## 2018-04-03 ENCOUNTER — LABORATORY RESULT (OUTPATIENT)
Age: 63
End: 2018-04-03

## 2018-04-03 ENCOUNTER — OUTPATIENT (OUTPATIENT)
Dept: OUTPATIENT SERVICES | Facility: HOSPITAL | Age: 63
LOS: 1 days | End: 2018-04-03
Payer: COMMERCIAL

## 2018-04-03 ENCOUNTER — INPATIENT (INPATIENT)
Facility: HOSPITAL | Age: 63
LOS: 2 days | Discharge: HOME | DRG: 187 | End: 2018-04-06
Attending: THORACIC SURGERY (CARDIOTHORACIC VASCULAR SURGERY) | Admitting: THORACIC SURGERY (CARDIOTHORACIC VASCULAR SURGERY)
Payer: COMMERCIAL

## 2018-04-03 VITALS
DIASTOLIC BLOOD PRESSURE: 73 MMHG | OXYGEN SATURATION: 95 % | SYSTOLIC BLOOD PRESSURE: 114 MMHG | HEART RATE: 70 BPM | RESPIRATION RATE: 13 BRPM | TEMPERATURE: 98.4 F

## 2018-04-03 VITALS
RESPIRATION RATE: 17 BRPM | DIASTOLIC BLOOD PRESSURE: 74 MMHG | OXYGEN SATURATION: 94 % | SYSTOLIC BLOOD PRESSURE: 143 MMHG | HEART RATE: 70 BPM | TEMPERATURE: 98 F

## 2018-04-03 VITALS — HEIGHT: 73 IN | WEIGHT: 224 LBS | BODY MASS INDEX: 29.69 KG/M2

## 2018-04-03 DIAGNOSIS — Z95.3 PRESENCE OF XENOGENIC HEART VALVE: ICD-10-CM

## 2018-04-03 DIAGNOSIS — Z95.2 PRESENCE OF PROSTHETIC HEART VALVE: Chronic | ICD-10-CM

## 2018-04-03 DIAGNOSIS — Z98.890 OTHER SPECIFIED POSTPROCEDURAL STATES: Chronic | ICD-10-CM

## 2018-04-03 DIAGNOSIS — J90 PLEURAL EFFUSION, NOT ELSEWHERE CLASSIFIED: ICD-10-CM

## 2018-04-03 DIAGNOSIS — Z09 ENCOUNTER FOR FOLLOW-UP EXAMINATION AFTER COMPLETED TREATMENT FOR CONDITIONS OTHER THAN MALIGNANT NEOPLASM: ICD-10-CM

## 2018-04-03 LAB — RAPID RVP RESULT: SIGNIFICANT CHANGE UP

## 2018-04-03 PROCEDURE — 71046 X-RAY EXAM CHEST 2 VIEWS: CPT | Mod: 26

## 2018-04-03 PROCEDURE — 93010 ELECTROCARDIOGRAM REPORT: CPT

## 2018-04-03 PROCEDURE — 71046 X-RAY EXAM CHEST 2 VIEWS: CPT

## 2018-04-03 PROCEDURE — 71250 CT THORAX DX C-: CPT | Mod: 26

## 2018-04-03 PROCEDURE — 99024 POSTOP FOLLOW-UP VISIT: CPT

## 2018-04-03 RX ORDER — PANTOPRAZOLE SODIUM 20 MG/1
40 TABLET, DELAYED RELEASE ORAL
Qty: 0 | Refills: 0 | Status: DISCONTINUED | OUTPATIENT
Start: 2018-04-03 | End: 2018-04-06

## 2018-04-03 RX ORDER — ENOXAPARIN SODIUM 100 MG/ML
40 INJECTION SUBCUTANEOUS DAILY
Qty: 0 | Refills: 0 | Status: DISCONTINUED | OUTPATIENT
Start: 2018-04-03 | End: 2018-04-06

## 2018-04-03 RX ORDER — ACETAMINOPHEN 500 MG
650 TABLET ORAL ONCE
Qty: 0 | Refills: 0 | Status: DISCONTINUED | OUTPATIENT
Start: 2018-04-03 | End: 2018-04-06

## 2018-04-03 RX ORDER — FLUTICASONE PROPIONATE 50 MCG
1 SPRAY, SUSPENSION NASAL
Qty: 0 | Refills: 0 | COMMUNITY

## 2018-04-03 RX ORDER — ZOLPIDEM TARTRATE 10 MG/1
5 TABLET ORAL AT BEDTIME
Qty: 0 | Refills: 0 | Status: DISCONTINUED | OUTPATIENT
Start: 2018-04-03 | End: 2018-04-06

## 2018-04-03 RX ORDER — LOSARTAN POTASSIUM 100 MG/1
25 TABLET, FILM COATED ORAL DAILY
Qty: 0 | Refills: 0 | Status: DISCONTINUED | OUTPATIENT
Start: 2018-04-03 | End: 2018-04-06

## 2018-04-03 RX ORDER — ASPIRIN/CALCIUM CARB/MAGNESIUM 324 MG
81 TABLET ORAL DAILY
Qty: 0 | Refills: 0 | Status: DISCONTINUED | OUTPATIENT
Start: 2018-04-03 | End: 2018-04-06

## 2018-04-03 RX ORDER — OXYCODONE AND ACETAMINOPHEN 5; 325 MG/1; MG/1
1 TABLET ORAL EVERY 6 HOURS
Qty: 0 | Refills: 0 | Status: DISCONTINUED | OUTPATIENT
Start: 2018-04-03 | End: 2018-04-06

## 2018-04-03 RX ORDER — MONTELUKAST 4 MG/1
10 TABLET, CHEWABLE ORAL DAILY
Qty: 0 | Refills: 0 | Status: DISCONTINUED | OUTPATIENT
Start: 2018-04-03 | End: 2018-04-06

## 2018-04-03 RX ORDER — DOCUSATE SODIUM 100 MG
100 CAPSULE ORAL THREE TIMES A DAY
Qty: 0 | Refills: 0 | Status: DISCONTINUED | OUTPATIENT
Start: 2018-04-03 | End: 2018-04-06

## 2018-04-03 RX ORDER — SIMVASTATIN 20 MG/1
20 TABLET, FILM COATED ORAL AT BEDTIME
Qty: 0 | Refills: 0 | Status: DISCONTINUED | OUTPATIENT
Start: 2018-04-03 | End: 2018-04-06

## 2018-04-03 RX ADMIN — ZOLPIDEM TARTRATE 5 MILLIGRAM(S): 10 TABLET ORAL at 22:01

## 2018-04-03 RX ADMIN — Medication 100 MILLIGRAM(S): at 22:01

## 2018-04-03 RX ADMIN — MONTELUKAST 10 MILLIGRAM(S): 4 TABLET, CHEWABLE ORAL at 17:51

## 2018-04-03 RX ADMIN — LOSARTAN POTASSIUM 25 MILLIGRAM(S): 100 TABLET, FILM COATED ORAL at 17:51

## 2018-04-03 RX ADMIN — SIMVASTATIN 20 MILLIGRAM(S): 20 TABLET, FILM COATED ORAL at 22:01

## 2018-04-03 RX ADMIN — ZOLPIDEM TARTRATE 5 MILLIGRAM(S): 10 TABLET ORAL at 23:03

## 2018-04-03 RX ADMIN — ENOXAPARIN SODIUM 40 MILLIGRAM(S): 100 INJECTION SUBCUTANEOUS at 17:52

## 2018-04-03 NOTE — H&P ADULT - PSH
H/O foot surgery    History of appendectomy    History of back surgery    S/P AVR (aortic valve replacement)

## 2018-04-03 NOTE — H&P ADULT - PMH
Bicuspid aortic valve    Chronic back pain    GERD (gastroesophageal reflux disease)    High cholesterol    Hypertension    Pleural effusion on right    Postnasal drip    Severe aortic stenosis    Sleep apnea

## 2018-04-03 NOTE — H&P ADULT - HISTORY OF PRESENT ILLNESS
63 y/o male h/o AVR(t) 3/2018 with Dr Blandon at [facility]. Post dual chamber PPM ([facility]) for CHB and treated for UTI with Cipro. Pt Completed four day course of po levequin prescribed by PMD but presents for follow up visit with persistent fever/ SOB and nonproductive cough. Chest xray consistent right pleural effusion/atelectasis. Bedside U/S of right right effusion demonstrates minimal fluid collection and evidence of pleural thickening.

## 2018-04-03 NOTE — H&P ADULT - NSHPPHYSICALEXAM_GEN_ALL_CORE
General: WN/WD NAD  Neurology: A&Ox3, nonfocal, WAITE x 4  Head:  Normocephalic, atraumatic  ENT:  Mucosa moist, no ulcerations  Neck:  Supple, no sinuses or palpable masses  Lymphatic:  No palpable cervical, supraclavicular, axillary or inguinal adenopathy  Respiratory: CTA B/L  CV: RRR, S1S2, no murmur  Abdominal: Soft, NT, ND no palpable mass  MSK: No edema, + peripheral pulses, FROM all 4 extremity  Incisions: intact, no erythema or drainage

## 2018-04-03 NOTE — H&P ADULT - PROBLEM SELECTOR PLAN 1
Check RVP  Repeat blood cultures.  C/w IV levoquin  Ct chest noncontrast r/o loculated right pleural collection

## 2018-04-03 NOTE — H&P ADULT - ASSESSMENT
63 y/o male h/o AVR(t) 3/12/18 post op PPM for CHB readmitted for fever and right pleural effusion/ atelectasis and pleural thickening.

## 2018-04-03 NOTE — H&P ADULT - NSHPREVIEWOFSYSTEMS_GEN_ALL_CORE
REVIEW OF SYSTEMS:  CONSTITUTIONAL: + fever, weight loss, or fatigue  EYES: No eye pain, visual disturbances, or discharge  ENMT:  No difficulty hearing, tinnitus, vertigo; No sinus or throat pain  NECK: No pain or stiffness  RESPIRATORY: + non productive cough,No wheezing, chills or hemoptysis; + shortness of breath  CARDIOVASCULAR: No chest pain,No palpitations, dizziness, or leg swelling  GASTROINTESTINAL: No abdominal or epigastric pain. No nausea, vomiting, or hematemesis; No diarrhea ,has No melena  GENITOURINARY: No dysuria, frequency, hematuria, or incontinence  NEUROLOGICAL: No headaches, memory loss, loss of strength, numbness, or tremors  SKIN: No itching, burning, rashes, or lesions   LYMPH NODES: No enlarged glands  ENDOCRINE: No heat or cold intolerance; No hair loss  MUSCULOSKELETAL: No joint pain or swelling; No muscle, back, or extremity pain  PSYCHIATRIC: No depression, anxiety, mood swings, or difficulty sleeping  HEME/LYMPH: No easy bruising, or bleeding gums  ALLERGY: No hives or eczema

## 2018-04-04 ENCOUNTER — TRANSCRIPTION ENCOUNTER (OUTPATIENT)
Age: 63
End: 2018-04-04

## 2018-04-04 ENCOUNTER — RESULT REVIEW (OUTPATIENT)
Age: 63
End: 2018-04-04

## 2018-04-04 LAB
ALBUMIN FLD-MCNC: 2.5 G/DL — SIGNIFICANT CHANGE UP
ANION GAP SERPL CALC-SCNC: 14 MMOL/L — SIGNIFICANT CHANGE UP (ref 5–17)
B PERT IGG+IGM PNL SER: ABNORMAL
BACTERIA UR CULT: NORMAL
BUN SERPL-MCNC: 13 MG/DL — SIGNIFICANT CHANGE UP (ref 7–23)
CALCIUM SERPL-MCNC: 9.2 MG/DL — SIGNIFICANT CHANGE UP (ref 8.4–10.5)
CHLORIDE SERPL-SCNC: 100 MMOL/L — SIGNIFICANT CHANGE UP (ref 96–108)
CO2 SERPL-SCNC: 22 MMOL/L — SIGNIFICANT CHANGE UP (ref 22–31)
COLOR FLD: SIGNIFICANT CHANGE UP
CREAT SERPL-MCNC: 1.18 MG/DL — SIGNIFICANT CHANGE UP (ref 0.5–1.3)
EOSINOPHIL # FLD: 10 % — SIGNIFICANT CHANGE UP
FLUID INTAKE SUBSTANCE CLASS: SIGNIFICANT CHANGE UP
FLUID SEGMENTED GRANULOCYTES: 2 % — SIGNIFICANT CHANGE UP
GLUCOSE FLD-MCNC: 82 MG/DL — SIGNIFICANT CHANGE UP
GLUCOSE SERPL-MCNC: 100 MG/DL — HIGH (ref 70–99)
GRAM STN FLD: SIGNIFICANT CHANGE UP
HCT VFR BLD CALC: 27.9 % — LOW (ref 39–50)
HGB BLD-MCNC: 9.1 G/DL — LOW (ref 13–17)
LDH SERPL L TO P-CCNC: 811 U/L — SIGNIFICANT CHANGE UP
LYMPHOCYTES # FLD: 67 % — SIGNIFICANT CHANGE UP
MCHC RBC-ENTMCNC: 27.6 PG — SIGNIFICANT CHANGE UP (ref 27–34)
MCHC RBC-ENTMCNC: 32.5 GM/DL — SIGNIFICANT CHANGE UP (ref 32–36)
MCV RBC AUTO: 84.9 FL — SIGNIFICANT CHANGE UP (ref 80–100)
MESOTHL CELL # FLD: 1 % — SIGNIFICANT CHANGE UP
MONOS+MACROS # FLD: 19 % — SIGNIFICANT CHANGE UP
NT-PROBNP SERPL-SCNC: 1923 PG/ML — HIGH (ref 0–300)
OTHER CELLS FLD MANUAL: 1 % — SIGNIFICANT CHANGE UP
PH FLD: 7.98 — SIGNIFICANT CHANGE UP
PLATELET # BLD AUTO: 102 K/UL — LOW (ref 150–400)
POTASSIUM SERPL-MCNC: 4.3 MMOL/L — SIGNIFICANT CHANGE UP (ref 3.5–5.3)
POTASSIUM SERPL-SCNC: 4.3 MMOL/L — SIGNIFICANT CHANGE UP (ref 3.5–5.3)
PROT FLD-MCNC: 4.6 G/DL — SIGNIFICANT CHANGE UP
RBC # BLD: 3.29 M/UL — LOW (ref 4.2–5.8)
RBC # FLD: 13.4 % — SIGNIFICANT CHANGE UP (ref 10.3–14.5)
RCV VOL RI: HIGH /UL (ref 0–5)
SODIUM SERPL-SCNC: 136 MMOL/L — SIGNIFICANT CHANGE UP (ref 135–145)
SPECIMEN SOURCE: SIGNIFICANT CHANGE UP
TOTAL NUCLEATED CELL COUNT, BODY FLUID: 2120 /UL — HIGH (ref 0–5)
TUBE TYPE: SIGNIFICANT CHANGE UP
WBC # BLD: 8.6 K/UL — SIGNIFICANT CHANGE UP (ref 3.8–10.5)
WBC # FLD AUTO: 8.6 K/UL — SIGNIFICANT CHANGE UP (ref 3.8–10.5)

## 2018-04-04 PROCEDURE — 88112 CYTOPATH CELL ENHANCE TECH: CPT | Mod: 26

## 2018-04-04 PROCEDURE — 71045 X-RAY EXAM CHEST 1 VIEW: CPT | Mod: 26

## 2018-04-04 PROCEDURE — 32557 INSERT CATH PLEURA W/ IMAGE: CPT | Mod: RT

## 2018-04-04 PROCEDURE — 88108 CYTOPATH CONCENTRATE TECH: CPT | Mod: 26,59

## 2018-04-04 PROCEDURE — 99254 IP/OBS CNSLTJ NEW/EST MOD 60: CPT | Mod: 57

## 2018-04-04 PROCEDURE — 88305 TISSUE EXAM BY PATHOLOGIST: CPT | Mod: 26

## 2018-04-04 RX ORDER — ALPRAZOLAM 0.25 MG
0.25 TABLET ORAL ONCE
Qty: 0 | Refills: 0 | Status: DISCONTINUED | OUTPATIENT
Start: 2018-04-04 | End: 2018-04-04

## 2018-04-04 RX ADMIN — ENOXAPARIN SODIUM 40 MILLIGRAM(S): 100 INJECTION SUBCUTANEOUS at 18:34

## 2018-04-04 RX ADMIN — OXYCODONE AND ACETAMINOPHEN 1 TABLET(S): 5; 325 TABLET ORAL at 19:00

## 2018-04-04 RX ADMIN — Medication 100 MILLIGRAM(S): at 21:51

## 2018-04-04 RX ADMIN — Medication 1 TABLET(S): at 12:21

## 2018-04-04 RX ADMIN — MONTELUKAST 10 MILLIGRAM(S): 4 TABLET, CHEWABLE ORAL at 12:21

## 2018-04-04 RX ADMIN — PANTOPRAZOLE SODIUM 40 MILLIGRAM(S): 20 TABLET, DELAYED RELEASE ORAL at 06:39

## 2018-04-04 RX ADMIN — LOSARTAN POTASSIUM 25 MILLIGRAM(S): 100 TABLET, FILM COATED ORAL at 06:38

## 2018-04-04 RX ADMIN — OXYCODONE AND ACETAMINOPHEN 1 TABLET(S): 5; 325 TABLET ORAL at 18:34

## 2018-04-04 RX ADMIN — ZOLPIDEM TARTRATE 5 MILLIGRAM(S): 10 TABLET ORAL at 22:12

## 2018-04-04 RX ADMIN — Medication 0.25 MILLIGRAM(S): at 14:41

## 2018-04-04 RX ADMIN — Medication 100 MILLIGRAM(S): at 06:38

## 2018-04-04 RX ADMIN — SIMVASTATIN 20 MILLIGRAM(S): 20 TABLET, FILM COATED ORAL at 21:51

## 2018-04-04 RX ADMIN — Medication 81 MILLIGRAM(S): at 12:21

## 2018-04-04 NOTE — DISCHARGE NOTE ADULT - MEDICATION SUMMARY - MEDICATIONS TO STOP TAKING
I will STOP taking the medications listed below when I get home from the hospital:    levocetirizine 5 mg oral tablet  -- 1 tab(s) by mouth once a day (in the evening)    ciprofloxacin 500 mg oral tablet  -- 1 tab(s) by mouth every 12 hours    zolpidem 10 mg oral tablet  -- 1 tab(s) by mouth once a day (at bedtime) MDD:1

## 2018-04-04 NOTE — DISCHARGE NOTE ADULT - OTHER SIGNIFICANT FINDINGS
VSS - Tele :  70  Cor: S1S2  Lungs: CTA  MT: CDI healing-stable  Abd: soft NT/ND + BS + BM  ext tr. edema, no calf tenderness

## 2018-04-04 NOTE — DISCHARGE NOTE ADULT - PATIENT PORTAL LINK FT
You can access the [facility] Patient Portal, offered by [facility], by registering with the following website: [email or web address]

## 2018-04-04 NOTE — DISCHARGE NOTE ADULT - ADDITIONAL INSTRUCTIONS
follow up appt with DR. Lex Blandon, cardiac surgeon - on Friday, April 13 @ 9am  follow up appt with your cardiologist and/or Primary Care MD- in 2-3 weeks  ambulate 4-5 times a day

## 2018-04-04 NOTE — DISCHARGE NOTE ADULT - CARE PROVIDERS DIRECT ADDRESSES
[email or web address]

## 2018-04-04 NOTE — DISCHARGE NOTE ADULT - MEDICATION SUMMARY - MEDICATIONS TO TAKE
I will START or STAY ON the medications listed below when I get home from the hospital:    spironolactone 25 mg oral tablet  -- 1 tab(s) by mouth once a day  -- Indication: For water pill    acetaminophen 325 mg oral tablet  -- 2 tab(s) by mouth once, As needed, For Temp greater than 38 C (100.4 F)  -- Indication: For mild pain    aspirin 81 mg oral delayed release tablet  -- 1 tab(s) by mouth once a day  -- Indication: For anti-platelet    oxyCODONE-acetaminophen 5 mg-325 mg oral tablet  -- 1 tab(s) by mouth every 6 hours, As needed, Moderate Pain (4 - 6) MDD:4  -- Indication: For Pain med    Benicar 40 mg oral tablet  -- 1/2  tab(s) by mouth once a day  -- Indication: For htn    colchicine 0.6 mg oral tablet  -- 1 tab(s) by mouth every 12 hours  -- Indication: For anti-inflammatory    simvastatin 20 mg oral tablet  -- 1 tab(s) by mouth once a day (at bedtime)  -- Indication: For hi cholesterol    furosemide 40 mg oral tablet  -- 1 tab(s) by mouth once a day  -- Indication: For water pill    docusate sodium 100 mg oral capsule  -- 1 cap(s) by mouth 3 times a day  -- Indication: For Stool softener    montelukast 10 mg oral tablet  -- 1 tab(s) by mouth once a day  -- Indication: For lungs    Nasacort 55 mcg/inh nasal aerosol  -- Indication: For allergies    pantoprazole 40 mg oral delayed release tablet  -- 1 tab(s) by mouth once a day  -- Indication: For gerd    levoFLOXacin 750 mg oral tablet  -- 1 tab(s) by mouth once a day   -- Avoid prolonged or excessive exposure to direct and/or artificial sunlight while taking this medication.  Do not take dairy products, antacids, or iron preparations within one hour of this medication.  Finish all this medication unless otherwise directed by prescriber.  May cause drowsiness or dizziness.  Medication should be taken with plenty of water.    -- Indication: For antibiotic    HYDROcodone-homatropine 5 mg-1.5 mg/5 mL oral syrup  -- 5 milliliter(s) by mouth every 6 to 8 hours MDD:20ml  -- Caution federal law prohibits the transfer of this drug to any person other  than the person for whom it was prescribed.  May cause drowsiness.  Alcohol may intensify this effect.  Use care when operating dangerous machinery.  Obtain medical advice before taking any non-prescription drugs as some may affect the action of this medication.    -- Indication: For cough syrup w/ codiene    Metanx oral tablet  -- 1 tab(s) by mouth once a day  -- Indication: For vitamin

## 2018-04-04 NOTE — DISCHARGE NOTE ADULT - HOSPITAL COURSE
61yo M sp AVR (t) PPM 3/2018 .  Presented with SOB cough and fever.  CXR showed effusion.  CT chest right pleural effusion with compression atelectasis.  4/4   IR drainage of right pleural effusion  4/5 UA negative, pleural fluid negative for organisms   4/6/18 - right pigtail removed 4/5 by IR PA- CXR no ptx- will d/c home this am - f/u appt with DR. Blandon on 4/13 w/ CXR prior to visit  d/c home on Hycodan as per Dr. Zafar & 2 wks of Colchicine lasix & aldactone

## 2018-04-04 NOTE — DISCHARGE NOTE ADULT - CARE PROVIDER_API CALL
Lex Blandon (MD), Surgery; Thoracic and Cardiac Surgery  [address]  [address] [place] [address]  Phone: [phone]  Fax: [phone]

## 2018-04-04 NOTE — CONSULT NOTE ADULT - ASSESSMENT
63yo M sp AVR (t) PPM.  3/18 .  Presented with SOB cough and fever.  CXR showed effusion.  CT chest right pleural effusion with compression atelectasis.

## 2018-04-04 NOTE — DISCHARGE NOTE ADULT - REASON FOR ADMISSION
fever/cough/ right pleural effusion readmit 4/3/18 fever/cough/ right pleural effusion-r pigtail placed @ IR - 400cc

## 2018-04-04 NOTE — DISCHARGE NOTE ADULT - HOME CARE AGENCY
[facility] [place] [phone] [facility] [phone] Referral has been made for a nurse to visit tomorrow.

## 2018-04-04 NOTE — DISCHARGE NOTE ADULT - PLAN OF CARE
remain free from SOB - s/p right pigtail placement follow up appt with Dr. Lex Blandon, cardiac surgeon on Friday, April 13 @ 9am  follow up appt with your cardiologist and/or Primary Care MD in 2-3 weeks  ambulate 4-5 times a day  no added salt -low cholesterol diet

## 2018-04-04 NOTE — DISCHARGE NOTE ADULT - NS AS ACTIVITY OBS
Stairs allowed/Sex allowed/Showering allowed/Walking-Indoors allowed/Walking-Outdoors allowed/No Heavy lifting/straining

## 2018-04-04 NOTE — DISCHARGE NOTE ADULT - CARE PLAN
Principal Discharge DX:	Pleural effusion on right  Goal:	remain free from SOB - s/p right pigtail placement  Assessment and plan of treatment:	follow up appt with Dr. Lex Blandon, cardiac surgeon on Friday, April 13 @ 9am  follow up appt with your cardiologist and/or Primary Care MD in 2-3 weeks  ambulate 4-5 times a day  no added salt -low cholesterol diet

## 2018-04-04 NOTE — CONSULT NOTE ADULT - SUBJECTIVE AND OBJECTIVE BOX
62y Male sp AVR and PPM presented with SOB/cough/fever    Past Medical History  Pleural effusion on right  Postnasal drip  Chronic back pain  GERD (gastroesophageal reflux disease)  Severe aortic stenosis  Bicuspid aortic valve  Sleep apnea  High cholesterol  Hypertension      Past Surgical History  S/P AVR (aortic valve replacement)  PPM  H/O foot surgery  History of appendectomy  History of back surgery      MEDICATIONS  (STANDING):  aspirin enteric coated 81 milliGRAM(s) Oral daily  docusate sodium 100 milliGRAM(s) Oral three times a day  enoxaparin Injectable 40 milliGRAM(s) SubCutaneous daily  levoFLOXacin IVPB 750 milliGRAM(s) IV Intermittent every 24 hours  losartan 25 milliGRAM(s) Oral daily  montelukast 10 milliGRAM(s) Oral daily  multivitamin 1 Tablet(s) Oral daily  pantoprazole    Tablet 40 milliGRAM(s) Oral before breakfast  simvastatin 20 milliGRAM(s) Oral at bedtime    MEDICATIONS  (PRN):  acetaminophen   Tablet 650 milliGRAM(s) Oral once PRN For Temp greater than 38 C (100.4 F)  oxyCODONE    5 mG/acetaminophen 325 mG 1 Tablet(s) Oral every 6 hours PRN Moderate Pain (4 - 6)  zolpidem 5 milliGRAM(s) Oral at bedtime PRN Insomnia  zolpidem 5 milliGRAM(s) Oral at bedtime PRN Insomnia      Allergies: No Known Allergies      SOCIAL HISTORY:  Smoker: [ ] Yes  [x No        PACK YEARS:                         WHEN QUIT?  ETOH use: [ ] Yes  [x No              FREQUENCY / QUANTITY:  Ilicit Drug use:  [ ] Yes  [x No        Relevant Family History  FAMILY HISTORY:  Family history of cancer (Father)  Family history of early CAD (Mother)  Hypertension (Mother)      Review of Systems  GENERAL:  Fevers[x chills[] sweats[] fatigue[] weight loss[] weight gain []                                      [ ] NEGATIVE  NEURO:  parathesias[] seizures []  syncope []  confusion []                                                                [xNEGATIVE                 EYES: glasses[]  blurry vision[]  discharge[] pain[] glaucoma []                                                           [x NEGATIVE                 ENMT:  difficulty hearing []  vertigo[]  dysphagia[] epistaxis[] recent dental work []                     [x NEGATIVE                 CV:  chest pain[] palpitations[] HUDSON [] diaphoresis [] edema[]                                                           [x NEGATIVE                                 RESPIRATORY:  wheezing[] SOB[x cough [x sputum[] hemoptysis[]                                                   [ ] NEGATIVE               GI:  nausea[]  vomiting []  diarrhea[] constipation [] melena []                                                          [x NEGATIVE            : hematuria[ ]  dysuria[ ] urgency[] incontinence[]                                                                          [x NEGATIVE                    MUSKULOSKELETAL:  arthritis[ ]  joint swelling [ ] muscle weakness [ ]                                            [x NEGATIVE                     SKIN/BREAST:  rash[ ] itching [ ]  hair loss[ ] masses[ ]                                                                          [x NEGATIVE                     PSYCH:  dementia [ ] depresion [ ] anxiety[x                                                                                          [ ] NEGATIVE                        HEME/LYMPH:  bruises easily[ ] enlarged lymph nodes[ ] tender lymph nodes[ ]                           [x NEGATIVE                      ENDOCRINE:  cold intolerance[ ] heat intolerance[ ] polydipsia[ ]                                                      [x NEGATIVE                        PHYSICAL EXAM  Vital Signs Last 24 Hrs  T(C): 36.8 (04 Apr 2018 10:12), Max: 38.4 (03 Apr 2018 18:37)  T(F): 98.3 (04 Apr 2018 10:12), Max: 101.1 (03 Apr 2018 18:37)  HR: 70 (04 Apr 2018 10:12) (70 - 71)  BP: 102/54 (04 Apr 2018 10:12) (102/54 - 143/74)  BP(mean): --  RR: 18 (04 Apr 2018 10:12) (17 - 18)  SpO2: 93% (04 Apr 2018 10:12) (92% - 95%)    General: Well nourished, well developed, no acute distress.                                                         Neuro: Normal exam oriented to person/place & time with no focal motor or sensory  deficits.                    Eyes: Normal exam of conjunctiva & lids, pupils equally reactive.   ENT: Normal exam of nasal/oral mucosa with absence of cyanosis.   Neck: Normal exam of jugular veins, trachea & thyroid.   Chest: Normal lung exam with good air movement absence of wheezes, rales, or rhonchi:  Sternal incision healing well                                           CV:  Auscultation: normal [ ] S3[ ] S4[ ] Irregular [ ] Rub[ ] Clicks[ ]  Murmurs none:[ ]systolic [ ]  diastolic [ ] holosystolic [ ]  Carotids: No Bruits[x Other____________ Abdominal Aorta: normal [ ] nonpalpable[ ]                                                                         GI: Normal exam of abdomen, liver & spleen with no noted masses or tenderness.                                                                                              Extremities: Normal no evidence of cyanosis or deformity Edema: none[xtrace[ ]1+[ ]2+[ ]3+[ ]4+[ ]  SKIN : Normal exam                                                           LABS:                        9.1    8.6   )-----------( 102      ( 04 Apr 2018 06:46 )             27.9     04-04    136  |  100  |  13  ----------------------------<  100<H>  4.3   |  22  |  1.18    Ca    9.2      04 Apr 2018 06:42

## 2018-04-05 LAB
ANION GAP SERPL CALC-SCNC: 12 MMOL/L — SIGNIFICANT CHANGE UP (ref 5–17)
BUN SERPL-MCNC: 16 MG/DL — SIGNIFICANT CHANGE UP (ref 7–23)
CALCIUM SERPL-MCNC: 9.1 MG/DL — SIGNIFICANT CHANGE UP (ref 8.4–10.5)
CHLORIDE SERPL-SCNC: 99 MMOL/L — SIGNIFICANT CHANGE UP (ref 96–108)
CO2 SERPL-SCNC: 23 MMOL/L — SIGNIFICANT CHANGE UP (ref 22–31)
CREAT SERPL-MCNC: 1.32 MG/DL — HIGH (ref 0.5–1.3)
GLUCOSE SERPL-MCNC: 96 MG/DL — SIGNIFICANT CHANGE UP (ref 70–99)
HCT VFR BLD CALC: 28 % — LOW (ref 39–50)
HGB BLD-MCNC: 8.9 G/DL — LOW (ref 13–17)
MCHC RBC-ENTMCNC: 27.2 PG — SIGNIFICANT CHANGE UP (ref 27–34)
MCHC RBC-ENTMCNC: 31.8 GM/DL — LOW (ref 32–36)
MCV RBC AUTO: 85.4 FL — SIGNIFICANT CHANGE UP (ref 80–100)
NIGHT BLUE STAIN TISS: SIGNIFICANT CHANGE UP
PLATELET # BLD AUTO: 132 K/UL — LOW (ref 150–400)
POTASSIUM SERPL-MCNC: 4.3 MMOL/L — SIGNIFICANT CHANGE UP (ref 3.5–5.3)
POTASSIUM SERPL-SCNC: 4.3 MMOL/L — SIGNIFICANT CHANGE UP (ref 3.5–5.3)
RBC # BLD: 3.28 M/UL — LOW (ref 4.2–5.8)
RBC # FLD: 13.5 % — SIGNIFICANT CHANGE UP (ref 10.3–14.5)
SODIUM SERPL-SCNC: 134 MMOL/L — LOW (ref 135–145)
SPECIMEN SOURCE: SIGNIFICANT CHANGE UP
WBC # BLD: 7.4 K/UL — SIGNIFICANT CHANGE UP (ref 3.8–10.5)
WBC # FLD AUTO: 7.4 K/UL — SIGNIFICANT CHANGE UP (ref 3.8–10.5)

## 2018-04-05 PROCEDURE — 71045 X-RAY EXAM CHEST 1 VIEW: CPT | Mod: 26

## 2018-04-05 PROCEDURE — 71045 X-RAY EXAM CHEST 1 VIEW: CPT | Mod: 26,77

## 2018-04-05 RX ORDER — SPIRONOLACTONE 25 MG/1
25 TABLET, FILM COATED ORAL DAILY
Qty: 0 | Refills: 0 | Status: DISCONTINUED | OUTPATIENT
Start: 2018-04-05 | End: 2018-04-06

## 2018-04-05 RX ORDER — COLCHICINE 0.6 MG
0.6 TABLET ORAL EVERY 12 HOURS
Qty: 0 | Refills: 0 | Status: DISCONTINUED | OUTPATIENT
Start: 2018-04-05 | End: 2018-04-06

## 2018-04-05 RX ORDER — FLUTICASONE PROPIONATE 50 MCG
1 SPRAY, SUSPENSION NASAL
Qty: 0 | Refills: 0 | Status: DISCONTINUED | OUTPATIENT
Start: 2018-04-05 | End: 2018-04-06

## 2018-04-05 RX ORDER — FUROSEMIDE 40 MG
40 TABLET ORAL DAILY
Qty: 0 | Refills: 0 | Status: DISCONTINUED | OUTPATIENT
Start: 2018-04-05 | End: 2018-04-06

## 2018-04-05 RX ADMIN — Medication 40 MILLIGRAM(S): at 10:52

## 2018-04-05 RX ADMIN — Medication 0.6 MILLIGRAM(S): at 06:40

## 2018-04-05 RX ADMIN — PANTOPRAZOLE SODIUM 40 MILLIGRAM(S): 20 TABLET, DELAYED RELEASE ORAL at 05:18

## 2018-04-05 RX ADMIN — OXYCODONE AND ACETAMINOPHEN 1 TABLET(S): 5; 325 TABLET ORAL at 04:07

## 2018-04-05 RX ADMIN — Medication 100 MILLIGRAM(S): at 21:02

## 2018-04-05 RX ADMIN — Medication 1 TABLET(S): at 10:52

## 2018-04-05 RX ADMIN — OXYCODONE AND ACETAMINOPHEN 1 TABLET(S): 5; 325 TABLET ORAL at 17:23

## 2018-04-05 RX ADMIN — MONTELUKAST 10 MILLIGRAM(S): 4 TABLET, CHEWABLE ORAL at 10:53

## 2018-04-05 RX ADMIN — ENOXAPARIN SODIUM 40 MILLIGRAM(S): 100 INJECTION SUBCUTANEOUS at 10:53

## 2018-04-05 RX ADMIN — SIMVASTATIN 20 MILLIGRAM(S): 20 TABLET, FILM COATED ORAL at 21:02

## 2018-04-05 RX ADMIN — LOSARTAN POTASSIUM 25 MILLIGRAM(S): 100 TABLET, FILM COATED ORAL at 05:18

## 2018-04-05 RX ADMIN — Medication 81 MILLIGRAM(S): at 10:53

## 2018-04-05 RX ADMIN — SPIRONOLACTONE 25 MILLIGRAM(S): 25 TABLET, FILM COATED ORAL at 06:40

## 2018-04-05 RX ADMIN — OXYCODONE AND ACETAMINOPHEN 1 TABLET(S): 5; 325 TABLET ORAL at 10:52

## 2018-04-05 RX ADMIN — ZOLPIDEM TARTRATE 5 MILLIGRAM(S): 10 TABLET ORAL at 22:02

## 2018-04-05 RX ADMIN — OXYCODONE AND ACETAMINOPHEN 1 TABLET(S): 5; 325 TABLET ORAL at 04:20

## 2018-04-05 RX ADMIN — Medication 0.6 MILLIGRAM(S): at 17:10

## 2018-04-05 RX ADMIN — ZOLPIDEM TARTRATE 5 MILLIGRAM(S): 10 TABLET ORAL at 21:02

## 2018-04-05 NOTE — PROGRESS NOTE ADULT - ASSESSMENT
63yo M sp AVR (t) PPM 3/2018 .  Presented with SOB cough and fever.  CXR showed effusion.  CT chest right pleural effusion with compression atelectasis.  4/4   IR drainage of right pleural effusion  4/5 UA negative, pleural fluid negative for organisms

## 2018-04-05 NOTE — PROGRESS NOTE ADULT - PROBLEM SELECTOR PLAN 1
Maintain right pigtail placement to LWS  c/w abx, asa, statin  Initiate lasix and aldactone Maintain right pigtail placement to LWS  c/w abx, asa, statin  Initiate lasix and aldactone   colchine for 2 weeks

## 2018-04-05 NOTE — PROGRESS NOTE ADULT - SUBJECTIVE AND OBJECTIVE BOX
Interventional Radiology Pre-Procedure Note    Procedure: Right chest tube placement    Diagnosis/Indication: Patient is a 62 year old gentleman s/p aortic valve replacement 3/2018 presenting with fever, found to have small to moderate right pleural effusion.    PAST MEDICAL & SURGICAL HISTORY:  Pleural effusion on right  Postnasal drip  Chronic back pain  GERD (gastroesophageal reflux disease)  Severe aortic stenosis  Bicuspid aortic valve  Sleep apnea  High cholesterol  Hypertension  S/P AVR (aortic valve replacement)  H/O foot surgery  History of appendectomy  History of back surgery     Allergies: No Known Allergies    LABS:  CBC Full  -  ( 04 Apr 2018 06:46 )  WBC Count : 8.6 K/uL  Hemoglobin : 9.1 g/dL  Hematocrit : 27.9 %  Platelet Count - Automated : 102 K/uL  Mean Cell Volume : 84.9 fl  Mean Cell Hemoglobin : 27.6 pg  Mean Cell Hemoglobin Concentration : 32.5 gm/dL      04-04    136  |  100  |  13  ----------------------------<  100<H>  4.3   |  22  |  1.18    Ca    9.2      04 Apr 2018 06:42    INR 1.19    Procedure/ risks/ benefits/ alternatives were explained, informed consent obtained from patient, verbalizes understanding.
Interventional Radiology Brief- Operative Note    Procedure: Right chest tube placement    Operators: Vangie Syed    Anesthesia (type): 2% lidocaine local    Contrast: None    EBL: Minimal    Findings/Follow up Plan of Care: Under ultrasound guidance, 5 FR drainer needle advanced into right pleural effusion. 10 FR pigtail catheter placed into right pleural space. A total of 360 cc dark red fluid aspirated. Samples sent for laboratory analysis and culture.    Specimens Removed: 360 cc dark red fluid    Implants: 10 FR pigtail    Complications: None    Condition/Disposition: Stable/Chest x ray    Please call Interventional Radiology x [phone] with any questions, concerns, or issues.
Interventional Radiology Follow- Up Note    S: No acute events overnight. Breathing improved.    O:    Vitals: T(F): 98.6 (04-05-18 @ 05:00), Max: 98.6 (04-05-18 @ 05:00)  HR: 70 (04-05-18 @ 05:00) (70 - 72)  BP: 113/65 (04-05-18 @ 05:00) (99/58 - 121/68)  RR: 18 (04-05-18 @ 05:00) (18 - 18)  SpO2: 92% (04-05-18 @ 05:00) (92% - 95%)    LABS:                        8.9    7.4   )-----------( 132      ( 05 Apr 2018 07:49 )             28.0     04-05    134<L>  |  99  |  16  ----------------------------<  96  4.3   |  23  |  1.32<H>    Ca    9.1      05 Apr 2018 06:54      Culture - Body Fluid with Gram Stain (collected 04 Apr 2018 21:24)  Source: .Body Fluid Pleural Fluid  Gram Stain (04 Apr 2018 22:51):    Few polymorphonuclear leukocytes per low power field    No organisms seen    OUT:    Chest Tube: 95 mL    Chest x ray- No PTX, right pleural effusion decreased    PHYSICAL EXAM:  General: Nontoxic, in NAD  Chest: Right chest tube to LCWS. Pleurevac containing dark red fluid.  Abdomen: Soft, NT, ND    Impression: 62 year old gentleman s/p AVR/PPM p/w pleural effusion s/p drainage and chest tube placement. Patient feels better. No organisms seen in fluid likely hematoma.    Plan:  -Follow-up AM CXR  -Follow-up cultures  -Continue to monitor chest tube output     Please call IR at extension [phone] with any questions, concerns, or issues regarding above.
Interventional Radiology Follow- Up Note  61yo M sp AVR (t) PPM 3/2018 admitted with right pleural effusion s/p drainage on 4/4 with Dr. schwab. IR called to remove the chest tube. Chest xray shows improvement in pleural effusion. pt notes improvement in cough and Difficulty breathing.        Vitals: T(F): 97.8 (04-05-18 @ 13:21), Max: 98.6 (04-05-18 @ 05:00)  HR: 70 (04-05-18 @ 13:21) (70 - 72)  BP: 108/65 (04-05-18 @ 13:21) (99/58 - 121/68)  RR: 18 (04-05-18 @ 13:21) (18 - 18)  SpO2: 96% (04-05-18 @ 13:21) (92% - 96%)  Wt(kg): --    LABS:                        8.9    7.4   )-----------( 132      ( 05 Apr 2018 07:49 )             28.0     04-05    134<L>  |  99  |  16  ----------------------------<  96  4.3   |  23  |  1.32<H>    Ca    9.1      05 Apr 2018 06:54          Culture: no growth   output : 160CC /24hrs    PHYSICAL EXAM:  General: Nontoxic, in NAD  Neuro:  Alert & oriented x 3  right chest tube area in the back was cleaned with chlora prep. Chest tube was cut and removed under sterile condition. Dark red bloody fluid leaked from some which resolved immediately with holding pressure.     Impression: 62y Male admitted with Pleural effusion s/p drainage and s/p removal and bedside.     Plan:  -chest xray  - Care per primary team  - D/w Dr. Ortega and Marlene Perez NP      Please call IR at extension [phone] with any questions, concerns, or issues regarding above.
VITAL SIGNS    Telemetry:   70  Vital Signs Last 24 Hrs  T(C): 37 (04-05-18 @ 05:00), Max: 37 (04-05-18 @ 05:00)  T(F): 98.6 (04-05-18 @ 05:00), Max: 98.6 (04-05-18 @ 05:00)  HR: 70 (04-05-18 @ 05:00) (70 - 72)  BP: 113/65 (04-05-18 @ 05:00) (99/58 - 121/68)  RR: 18 (04-05-18 @ 05:00) (18 - 18)  SpO2: 92% (04-05-18 @ 05:00) (92% - 95%)            04-04 @ 07:01  -  04-05 @ 07:00  --------------------------------------------------------  IN: 720 mL / OUT: 1020 mL / NET: -300 mL    04-05 @ 07:01  -  04-05 @ 10:43  --------------------------------------------------------  IN: 300 mL / OUT: 225 mL / NET: 75 mL     Daily   Admit Wt: Drug Dosing Weight  Height (cm): 182.88 (03 Apr 2018 17:21)  Weight (kg): 101 (03 Apr 2018 17:21)  BMI (kg/m2): 30.2 (03 Apr 2018 17:21)  BSA (m2): 2.23 (03 Apr 2018 17:21)  MEDICATIONS  acetaminophen   Tablet 650 milliGRAM(s) Oral once PRN  aspirin enteric coated 81 milliGRAM(s) Oral daily  colchicine 0.6 milliGRAM(s) Oral every 12 hours  docusate sodium 100 milliGRAM(s) Oral three times a day  enoxaparin Injectable 40 milliGRAM(s) SubCutaneous daily  furosemide    Tablet 40 milliGRAM(s) Oral daily  levoFLOXacin IVPB 750 milliGRAM(s) IV Intermittent every 24 hours  losartan 25 milliGRAM(s) Oral daily  montelukast 10 milliGRAM(s) Oral daily  multivitamin 1 Tablet(s) Oral daily  oxyCODONE    5 mG/acetaminophen 325 mG 1 Tablet(s) Oral every 6 hours PRN  pantoprazole    Tablet 40 milliGRAM(s) Oral before breakfast  simvastatin 20 milliGRAM(s) Oral at bedtime  spironolactone 25 milliGRAM(s) Oral daily  zolpidem 5 milliGRAM(s) Oral at bedtime PRN  zolpidem 5 milliGRAM(s) Oral at bedtime PRN      PHYSICAL EXAM  Subjective: NAd  Neurology: alert and oriented x 3, nonfocal, no gross deficits  CV : S1S2  Sternal Wound :  CDI , Stable  Lungs: right base decreased breath sounds, pigtail to lws -23 cc out put overnight  Abdomen: soft, NT,ND, ( )BM  :  voiding  Extremities: -c/c/e      LABS  04-05    134<L>  |  99  |  16  ----------------------------<  96  4.3   |  23  |  1.32<H>    Ca    9.1      05 Apr 2018 06:54                                   8.9    7.4   )-----------( 132      ( 05 Apr 2018 07:49 )             28.0                 PAST MEDICAL & SURGICAL HISTORY:  Pleural effusion on right  Postnasal drip  Chronic back pain  GERD (gastroesophageal reflux disease)  Severe aortic stenosis  Bicuspid aortic valve  Sleep apnea  High cholesterol  Hypertension  S/P AVR (aortic valve replacement)  H/O foot surgery  History of appendectomy  History of back surgery

## 2018-04-06 VITALS — WEIGHT: 219.14 LBS

## 2018-04-06 LAB
ANION GAP SERPL CALC-SCNC: 13 MMOL/L — SIGNIFICANT CHANGE UP (ref 5–17)
BUN SERPL-MCNC: 16 MG/DL — SIGNIFICANT CHANGE UP (ref 7–23)
CALCIUM SERPL-MCNC: 8.9 MG/DL — SIGNIFICANT CHANGE UP (ref 8.4–10.5)
CHLORIDE SERPL-SCNC: 100 MMOL/L — SIGNIFICANT CHANGE UP (ref 96–108)
CO2 SERPL-SCNC: 23 MMOL/L — SIGNIFICANT CHANGE UP (ref 22–31)
CREAT SERPL-MCNC: 1.34 MG/DL — HIGH (ref 0.5–1.3)
GLUCOSE SERPL-MCNC: 107 MG/DL — HIGH (ref 70–99)
HCT VFR BLD CALC: 27.3 % — LOW (ref 39–50)
HGB BLD-MCNC: 8.7 G/DL — LOW (ref 13–17)
MCHC RBC-ENTMCNC: 27 PG — SIGNIFICANT CHANGE UP (ref 27–34)
MCHC RBC-ENTMCNC: 31.9 GM/DL — LOW (ref 32–36)
MCV RBC AUTO: 84.7 FL — SIGNIFICANT CHANGE UP (ref 80–100)
PLATELET # BLD AUTO: 136 K/UL — LOW (ref 150–400)
POTASSIUM SERPL-MCNC: 3.9 MMOL/L — SIGNIFICANT CHANGE UP (ref 3.5–5.3)
POTASSIUM SERPL-SCNC: 3.9 MMOL/L — SIGNIFICANT CHANGE UP (ref 3.5–5.3)
RBC # BLD: 3.22 M/UL — LOW (ref 4.2–5.8)
RBC # FLD: 13.8 % — SIGNIFICANT CHANGE UP (ref 10.3–14.5)
SODIUM SERPL-SCNC: 136 MMOL/L — SIGNIFICANT CHANGE UP (ref 135–145)
WBC # BLD: 6.7 K/UL — SIGNIFICANT CHANGE UP (ref 3.8–10.5)
WBC # FLD AUTO: 6.7 K/UL — SIGNIFICANT CHANGE UP (ref 3.8–10.5)

## 2018-04-06 PROCEDURE — 83615 LACTATE (LD) (LDH) ENZYME: CPT

## 2018-04-06 PROCEDURE — C1769: CPT

## 2018-04-06 PROCEDURE — 87070 CULTURE OTHR SPECIMN AEROBIC: CPT

## 2018-04-06 PROCEDURE — 87116 MYCOBACTERIA CULTURE: CPT

## 2018-04-06 PROCEDURE — 89051 BODY FLUID CELL COUNT: CPT

## 2018-04-06 PROCEDURE — 87206 SMEAR FLUORESCENT/ACID STAI: CPT

## 2018-04-06 PROCEDURE — 83880 ASSAY OF NATRIURETIC PEPTIDE: CPT

## 2018-04-06 PROCEDURE — 85027 COMPLETE CBC AUTOMATED: CPT

## 2018-04-06 PROCEDURE — 87075 CULTR BACTERIA EXCEPT BLOOD: CPT

## 2018-04-06 PROCEDURE — 71045 X-RAY EXAM CHEST 1 VIEW: CPT

## 2018-04-06 PROCEDURE — 83986 ASSAY PH BODY FLUID NOS: CPT

## 2018-04-06 PROCEDURE — 87581 M.PNEUMON DNA AMP PROBE: CPT

## 2018-04-06 PROCEDURE — 80048 BASIC METABOLIC PNL TOTAL CA: CPT

## 2018-04-06 PROCEDURE — 32557 INSERT CATH PLEURA W/ IMAGE: CPT

## 2018-04-06 PROCEDURE — 93005 ELECTROCARDIOGRAM TRACING: CPT

## 2018-04-06 PROCEDURE — 87205 SMEAR GRAM STAIN: CPT

## 2018-04-06 PROCEDURE — C1729: CPT

## 2018-04-06 PROCEDURE — 88305 TISSUE EXAM BY PATHOLOGIST: CPT

## 2018-04-06 PROCEDURE — 87633 RESP VIRUS 12-25 TARGETS: CPT

## 2018-04-06 PROCEDURE — 71045 X-RAY EXAM CHEST 1 VIEW: CPT | Mod: 26

## 2018-04-06 PROCEDURE — 88112 CYTOPATH CELL ENHANCE TECH: CPT

## 2018-04-06 PROCEDURE — 71250 CT THORAX DX C-: CPT

## 2018-04-06 PROCEDURE — 84157 ASSAY OF PROTEIN OTHER: CPT

## 2018-04-06 PROCEDURE — 87486 CHLMYD PNEUM DNA AMP PROBE: CPT

## 2018-04-06 PROCEDURE — 82945 GLUCOSE OTHER FLUID: CPT

## 2018-04-06 PROCEDURE — 87798 DETECT AGENT NOS DNA AMP: CPT

## 2018-04-06 PROCEDURE — 82042 OTHER SOURCE ALBUMIN QUAN EA: CPT

## 2018-04-06 PROCEDURE — 87015 SPECIMEN INFECT AGNT CONCNTJ: CPT

## 2018-04-06 PROCEDURE — 87040 BLOOD CULTURE FOR BACTERIA: CPT

## 2018-04-06 RX ORDER — FUROSEMIDE 40 MG
1 TABLET ORAL
Qty: 14 | Refills: 0 | OUTPATIENT
Start: 2018-04-06 | End: 2018-04-19

## 2018-04-06 RX ORDER — DIPHENHYDRAMINE HCL 50 MG
25 CAPSULE ORAL
Qty: 0 | Refills: 0 | COMMUNITY

## 2018-04-06 RX ORDER — COLCHICINE 0.6 MG
1 TABLET ORAL
Qty: 28 | Refills: 0 | OUTPATIENT
Start: 2018-04-06 | End: 2018-04-19

## 2018-04-06 RX ORDER — SPIRONOLACTONE 25 MG/1
1 TABLET, FILM COATED ORAL
Qty: 14 | Refills: 0 | OUTPATIENT
Start: 2018-04-06 | End: 2018-04-19

## 2018-04-06 RX ORDER — ACETAMINOPHEN 500 MG
2 TABLET ORAL
Qty: 0 | Refills: 0 | COMMUNITY
Start: 2018-04-06

## 2018-04-06 RX ADMIN — PANTOPRAZOLE SODIUM 40 MILLIGRAM(S): 20 TABLET, DELAYED RELEASE ORAL at 05:33

## 2018-04-06 RX ADMIN — LOSARTAN POTASSIUM 25 MILLIGRAM(S): 100 TABLET, FILM COATED ORAL at 05:33

## 2018-04-06 RX ADMIN — Medication 81 MILLIGRAM(S): at 12:52

## 2018-04-06 RX ADMIN — MONTELUKAST 10 MILLIGRAM(S): 4 TABLET, CHEWABLE ORAL at 12:52

## 2018-04-06 RX ADMIN — Medication 100 MILLIGRAM(S): at 05:33

## 2018-04-06 RX ADMIN — Medication 0.6 MILLIGRAM(S): at 05:33

## 2018-04-06 RX ADMIN — Medication 40 MILLIGRAM(S): at 05:33

## 2018-04-06 RX ADMIN — ENOXAPARIN SODIUM 40 MILLIGRAM(S): 100 INJECTION SUBCUTANEOUS at 12:52

## 2018-04-06 RX ADMIN — Medication 1 TABLET(S): at 12:52

## 2018-04-06 RX ADMIN — SPIRONOLACTONE 25 MILLIGRAM(S): 25 TABLET, FILM COATED ORAL at 05:33

## 2018-04-07 RX ORDER — CIPROFLOXACIN LACTATE 400MG/40ML
1 VIAL (ML) INTRAVENOUS
Qty: 4 | Refills: 0 | OUTPATIENT
Start: 2018-04-07 | End: 2018-04-10

## 2018-04-08 LAB
BACTERIA BLD CULT: NORMAL
BACTERIA BLD CULT: NORMAL

## 2018-04-09 LAB
CULTURE RESULTS: SIGNIFICANT CHANGE UP
SPECIMEN SOURCE: SIGNIFICANT CHANGE UP

## 2018-04-10 LAB
COMMENT - FLUIDS: SIGNIFICANT CHANGE UP

## 2018-04-13 ENCOUNTER — APPOINTMENT (OUTPATIENT)
Dept: CARDIOTHORACIC SURGERY | Facility: CLINIC | Age: 63
End: 2018-04-13
Payer: COMMERCIAL

## 2018-04-13 VITALS
HEART RATE: 78 BPM | DIASTOLIC BLOOD PRESSURE: 73 MMHG | SYSTOLIC BLOOD PRESSURE: 107 MMHG | HEIGHT: 73 IN | OXYGEN SATURATION: 98 % | WEIGHT: 218 LBS | RESPIRATION RATE: 16 BRPM | BODY MASS INDEX: 28.89 KG/M2

## 2018-04-13 DIAGNOSIS — Z95.3 PRESENCE OF XENOGENIC HEART VALVE: ICD-10-CM

## 2018-04-13 PROCEDURE — 99024 POSTOP FOLLOW-UP VISIT: CPT

## 2018-04-16 ENCOUNTER — APPOINTMENT (OUTPATIENT)
Dept: HEART AND VASCULAR | Facility: CLINIC | Age: 63
End: 2018-04-16
Payer: COMMERCIAL

## 2018-04-16 VITALS
WEIGHT: 218 LBS | HEIGHT: 73 IN | HEART RATE: 70 BPM | BODY MASS INDEX: 28.89 KG/M2 | DIASTOLIC BLOOD PRESSURE: 63 MMHG | SYSTOLIC BLOOD PRESSURE: 130 MMHG

## 2018-04-16 PROBLEM — J90 PLEURAL EFFUSION, NOT ELSEWHERE CLASSIFIED: Chronic | Status: ACTIVE | Noted: 2018-04-03

## 2018-04-16 PROCEDURE — 93280 PM DEVICE PROGR EVAL DUAL: CPT

## 2018-04-16 PROCEDURE — 99024 POSTOP FOLLOW-UP VISIT: CPT

## 2018-04-19 PROBLEM — Z95.3 S/P AORTIC VALVE REPLACEMENT WITH BIOPROSTHETIC VALVE: Status: ACTIVE | Noted: 2018-03-29

## 2018-05-26 LAB
CULTURE RESULTS: SIGNIFICANT CHANGE UP
SPECIMEN SOURCE: SIGNIFICANT CHANGE UP

## 2018-10-15 ENCOUNTER — APPOINTMENT (OUTPATIENT)
Dept: HEART AND VASCULAR | Facility: CLINIC | Age: 63
End: 2018-10-15
Payer: COMMERCIAL

## 2018-10-15 VITALS
BODY MASS INDEX: 31.29 KG/M2 | SYSTOLIC BLOOD PRESSURE: 127 MMHG | DIASTOLIC BLOOD PRESSURE: 80 MMHG | WEIGHT: 231 LBS | HEIGHT: 72 IN | HEART RATE: 69 BPM

## 2018-10-15 PROCEDURE — 93280 PM DEVICE PROGR EVAL DUAL: CPT

## 2019-05-19 ENCOUNTER — FORM ENCOUNTER (OUTPATIENT)
Age: 64
End: 2019-05-19

## 2019-05-20 ENCOUNTER — APPOINTMENT (OUTPATIENT)
Dept: HEART AND VASCULAR | Facility: CLINIC | Age: 64
End: 2019-05-20
Payer: COMMERCIAL

## 2019-05-20 ENCOUNTER — OUTPATIENT (OUTPATIENT)
Dept: OUTPATIENT SERVICES | Facility: HOSPITAL | Age: 64
LOS: 1 days | End: 2019-05-20
Payer: COMMERCIAL

## 2019-05-20 VITALS
BODY MASS INDEX: 33.86 KG/M2 | DIASTOLIC BLOOD PRESSURE: 80 MMHG | HEIGHT: 72 IN | HEART RATE: 85 BPM | WEIGHT: 250 LBS | SYSTOLIC BLOOD PRESSURE: 138 MMHG

## 2019-05-20 DIAGNOSIS — Z98.890 OTHER SPECIFIED POSTPROCEDURAL STATES: Chronic | ICD-10-CM

## 2019-05-20 DIAGNOSIS — I42.9 CARDIOMYOPATHY, UNSPECIFIED: ICD-10-CM

## 2019-05-20 DIAGNOSIS — Z95.2 PRESENCE OF PROSTHETIC HEART VALVE: Chronic | ICD-10-CM

## 2019-05-20 DIAGNOSIS — I48.91 UNSPECIFIED ATRIAL FIBRILLATION: ICD-10-CM

## 2019-05-20 PROCEDURE — 93306 TTE W/DOPPLER COMPLETE: CPT | Mod: 26

## 2019-05-20 PROCEDURE — 93280 PM DEVICE PROGR EVAL DUAL: CPT

## 2019-05-20 PROCEDURE — 93306 TTE W/DOPPLER COMPLETE: CPT

## 2019-05-22 NOTE — PROCEDURE
[Complete Heart Block] : complete heart block [Pacemaker] : pacemaker [DDI] : DDI [Normal] : The battery status is normal. [Lead Imp:  ___ohms] : lead impedance was [unfilled] ohms [Sensing Amplitude ___mv] : sensing amplitude was [unfilled] mv [___V @] : [unfilled] V [___ ms] : [unfilled] ms [de-identified] : ESCAPE ~ 40 BPM [de-identified] : JUVENAL [de-identified] : ASSURITY [de-identified] : 3/15/18 [de-identified] : 70 [de-identified] : AT/AF <1 % (<30 seconds PAT)\par  99%

## 2019-05-22 NOTE — DISCUSSION/SUMMARY
[FreeTextEntry1] : Mr. Walton is a 63 year-old gentleman s/p mini AVR complicated by complete heart block s/p PPM placement 3/15/18.  His device is functioning appropriately as programmed and all measured data is WNL.  There were no significant arrhythmia events for review on interrogation.  No further AT/AF noted; remains programmed at DDD.  EF preserved on Echo today.  He will return for follow-up in six months and knows to call with any questions or concerns in the interim.

## 2019-05-22 NOTE — HISTORY OF PRESENT ILLNESS
[de-identified] : Mr. Walton is a pleasant 63 year old gentleman with a past medical history significant for HTN, HLD, GERD, TYLER, chronic back pain, bicuspid AV with  aortic stenosis and ascending aortic aneurysm s/p mini AVR on 3/12/18.  His post operative course was notable for complete heart block.  He is s/p PPM placement on 3/15/18 and presents for routine follow-up today.  He has noted mild HUDSON with stair climbing that is unchanged recent.  Otherwise he is feeling well and offers no device/incision complaints. \par \par Echo 5/20/19 significant for preserved LVEF

## 2019-11-25 ENCOUNTER — APPOINTMENT (OUTPATIENT)
Dept: HEART AND VASCULAR | Facility: CLINIC | Age: 64
End: 2019-11-25

## 2021-06-21 NOTE — DISCHARGE NOTE ADULT - NS MD DC FALL RISK RISK
Conjuntivae and eyelids appear normal, Sclerae : White without injection For information on Fall & Injury Prevention, visit [email or web address]

## 2022-12-02 ENCOUNTER — OFFICE (OUTPATIENT)
Dept: URBAN - METROPOLITAN AREA CLINIC 63 | Facility: CLINIC | Age: 67
Setting detail: OPHTHALMOLOGY
End: 2022-12-02
Payer: MEDICARE

## 2022-12-02 DIAGNOSIS — H35.371: ICD-10-CM

## 2022-12-02 DIAGNOSIS — H35.3132: ICD-10-CM

## 2022-12-02 DIAGNOSIS — H33.322: ICD-10-CM

## 2022-12-02 DIAGNOSIS — H43.813: ICD-10-CM

## 2022-12-02 DIAGNOSIS — H35.033: ICD-10-CM

## 2022-12-02 PROCEDURE — 92134 CPTRZ OPH DX IMG PST SGM RTA: CPT | Performed by: SPECIALIST

## 2022-12-02 PROCEDURE — 92235 FLUORESCEIN ANGRPH MLTIFRAME: CPT | Performed by: SPECIALIST

## 2022-12-02 PROCEDURE — 92014 COMPRE OPH EXAM EST PT 1/>: CPT | Performed by: SPECIALIST

## 2022-12-02 ASSESSMENT — CONFRONTATIONAL VISUAL FIELD TEST (CVF)
OD_FINDINGS: FULL
OS_FINDINGS: FULL

## 2022-12-02 ASSESSMENT — VISUAL ACUITY
OD_BCVA: 20/20
OS_BCVA: 20/20

## 2022-12-02 ASSESSMENT — TEAR BREAK UP TIME (TBUT): OD_TBUT: 1+

## 2022-12-02 ASSESSMENT — TONOMETRY
OS_IOP_MMHG: 16
OD_IOP_MMHG: 15

## 2023-06-16 ENCOUNTER — OFFICE (OUTPATIENT)
Dept: URBAN - METROPOLITAN AREA CLINIC 63 | Facility: CLINIC | Age: 68
Setting detail: OPHTHALMOLOGY
End: 2023-06-16
Payer: MEDICARE

## 2023-06-16 DIAGNOSIS — H33.322: ICD-10-CM

## 2023-06-16 DIAGNOSIS — H35.371: ICD-10-CM

## 2023-06-16 DIAGNOSIS — H35.3132: ICD-10-CM

## 2023-06-16 DIAGNOSIS — H35.033: ICD-10-CM

## 2023-06-16 DIAGNOSIS — H43.813: ICD-10-CM

## 2023-06-16 PROCEDURE — 92235 FLUORESCEIN ANGRPH MLTIFRAME: CPT | Performed by: SPECIALIST

## 2023-06-16 PROCEDURE — 92134 CPTRZ OPH DX IMG PST SGM RTA: CPT | Performed by: SPECIALIST

## 2023-06-16 PROCEDURE — 92014 COMPRE OPH EXAM EST PT 1/>: CPT | Performed by: SPECIALIST

## 2023-06-16 ASSESSMENT — VISUAL ACUITY
OD_BCVA: 20/20
OS_BCVA: 20/20

## 2023-06-16 ASSESSMENT — TONOMETRY
OS_IOP_MMHG: 14
OD_IOP_MMHG: 14

## 2023-06-16 ASSESSMENT — CONFRONTATIONAL VISUAL FIELD TEST (CVF)
OD_FINDINGS: FULL
OS_FINDINGS: FULL

## 2023-06-16 ASSESSMENT — TEAR BREAK UP TIME (TBUT): OD_TBUT: 1+

## 2023-10-24 NOTE — BRIEF OPERATIVE NOTE - OPERATION/FINDINGS
EF 40-45% Erivedge Pregnancy And Lactation Text: This medication is Pregnancy Category X and is absolutely contraindicated during pregnancy. It is unknown if it is excreted in breast milk.

## 2023-12-01 ENCOUNTER — OFFICE (OUTPATIENT)
Dept: URBAN - METROPOLITAN AREA CLINIC 110 | Facility: CLINIC | Age: 68
Setting detail: OPHTHALMOLOGY
End: 2023-12-01
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
## 2023-12-01 PROCEDURE — NO SHOW FE NO SHOW FEE: Performed by: SPECIALIST
